# Patient Record
Sex: FEMALE | Race: BLACK OR AFRICAN AMERICAN
[De-identification: names, ages, dates, MRNs, and addresses within clinical notes are randomized per-mention and may not be internally consistent; named-entity substitution may affect disease eponyms.]

---

## 2018-09-12 NOTE — PRE-PROCEDURE NOTE/ATTESTATION
Pre-Procedure Note/Attestation


Complete Prior to Procedure


Planned Procedure:  left


Procedure Narrative:


phaco with IOL





Indications for Procedure


Pre-Operative Diagnosis:


cataract





Attestation


I attest that I discussed the nature of the procedure; its benefits; risks and 

complications; and alternatives (and the risks and benefits of such alternatives

), prior to the procedure, with the patient (or the patient's legal 

representative).





I attest that, if there was a reasonable possibility of needing a blood 

transfusion, the patient (or the patient's legal representative) was given the 

[facility] standardized written summary, pursuant 

to the Dwayne [place] Blood Safety Act ([place] Health and Safety Code # 1645, as 

amended).





I attest that I re-evaluated the patient just prior to the surgery and that 

there has been no change in the patient's H&P, except as documented below:











JEANNETTE BORGES Sep 12, 2018 16:10

## 2018-09-12 NOTE — OPTHALMOLOGY H&P
Ophthalmology H&P


H&P


Chief Complaint:  decreased vision in left eye





HPI


Vision Affects Ability to:  read, focus/use eyes together, manage personal 

affairs


HPI Narrative


blurry vision





Exam


Visual Acuity:  OD: 20/60    OS: 20/100


Tension:  OD: 19         OS: 20


Eye Exam:  normal OU: external exam, palpebral fissure-width, marginal reflex 

distance, levator function, corneas, anterior chambers, fundus exam; findings: 

lens - OD: ns    OS: ns





Assessment/Plan


Diagnosis:  


(1) Nuclear sclerotic cataract of left eye


Treatment Plan:  cataract extraction w/ lens implant


Goals of Treatment:  improvement of vision, enhance quality of life





Attestation


Attestation


The risks and benefits of the surgery as well as alternative procedures were 

explained to the patient in detail.











JEANNETTE BORGES Sep 12, 2018 16:12

## 2018-09-13 LAB
ADD MANUAL DIFF: YES
ANION GAP SERPL CALC-SCNC: 7 MMOL/L (ref 5–15)
APTT BLD: 24 SEC (ref 23–33)
BUN SERPL-MCNC: 15 MG/DL (ref 7–18)
CALCIUM SERPL-MCNC: 8.9 MG/DL (ref 8.5–10.1)
CHLORIDE SERPL-SCNC: 105 MMOL/L (ref 98–107)
CO2 SERPL-SCNC: 31 MMOL/L (ref 21–32)
CREAT SERPL-MCNC: 0.9 MG/DL (ref 0.55–1.3)
ERYTHROCYTE [DISTWIDTH] IN BLOOD BY AUTOMATED COUNT: 17.5 % (ref 11.6–14.8)
HCT VFR BLD CALC: 42.8 % (ref 37–47)
HGB BLD-MCNC: 13.6 G/DL (ref 12–16)
INR PPP: 1 (ref 0.9–1.1)
MCV RBC AUTO: 94 FL (ref 80–99)
PLATELET # BLD: 272 K/UL (ref 150–450)
POTASSIUM SERPL-SCNC: 3.7 MMOL/L (ref 3.5–5.1)
RBC # BLD AUTO: 4.53 M/UL (ref 4.2–5.4)
SODIUM SERPL-SCNC: 143 MMOL/L (ref 136–145)
WBC # BLD AUTO: 4.6 K/UL (ref 4.8–10.8)

## 2018-09-16 NOTE — CARDIOLOGY REPORT
--------------- APPROVED REPORT --------------





EKG Measurement

Heart [id number]

OR [id number]

[id number]

[id number]

[id number]





Normal sinus rhythm with sinus arrhythmia

Moderate voltage criteria for LVH, may be normal variant

Prolonged QT

Abnormal ECG

## 2018-09-17 ENCOUNTER — HOSPITAL ENCOUNTER (OUTPATIENT)
Dept: HOSPITAL 72 - SUR | Age: 73
Discharge: HOME | End: 2018-09-17
Payer: MEDICARE

## 2018-09-17 VITALS — SYSTOLIC BLOOD PRESSURE: 136 MMHG | DIASTOLIC BLOOD PRESSURE: 76 MMHG

## 2018-09-17 VITALS — SYSTOLIC BLOOD PRESSURE: 130 MMHG | DIASTOLIC BLOOD PRESSURE: 70 MMHG

## 2018-09-17 VITALS — DIASTOLIC BLOOD PRESSURE: 80 MMHG | SYSTOLIC BLOOD PRESSURE: 137 MMHG

## 2018-09-17 VITALS — SYSTOLIC BLOOD PRESSURE: 132 MMHG | DIASTOLIC BLOOD PRESSURE: 71 MMHG

## 2018-09-17 VITALS — DIASTOLIC BLOOD PRESSURE: 81 MMHG | SYSTOLIC BLOOD PRESSURE: 133 MMHG

## 2018-09-17 VITALS — DIASTOLIC BLOOD PRESSURE: 78 MMHG | SYSTOLIC BLOOD PRESSURE: 139 MMHG

## 2018-09-17 VITALS — SYSTOLIC BLOOD PRESSURE: 132 MMHG | DIASTOLIC BLOOD PRESSURE: 79 MMHG

## 2018-09-17 VITALS — BODY MASS INDEX: 29.41 KG/M2 | HEIGHT: 63 IN | WEIGHT: 166 LBS

## 2018-09-17 VITALS — SYSTOLIC BLOOD PRESSURE: 134 MMHG | DIASTOLIC BLOOD PRESSURE: 74 MMHG

## 2018-09-17 VITALS — SYSTOLIC BLOOD PRESSURE: 141 MMHG | DIASTOLIC BLOOD PRESSURE: 79 MMHG

## 2018-09-17 VITALS — DIASTOLIC BLOOD PRESSURE: 79 MMHG | SYSTOLIC BLOOD PRESSURE: 132 MMHG

## 2018-09-17 DIAGNOSIS — J44.9: ICD-10-CM

## 2018-09-17 DIAGNOSIS — M19.90: ICD-10-CM

## 2018-09-17 DIAGNOSIS — I11.0: ICD-10-CM

## 2018-09-17 DIAGNOSIS — I50.9: ICD-10-CM

## 2018-09-17 DIAGNOSIS — H40.9: ICD-10-CM

## 2018-09-17 DIAGNOSIS — M32.9: ICD-10-CM

## 2018-09-17 DIAGNOSIS — K21.9: ICD-10-CM

## 2018-09-17 DIAGNOSIS — E66.9: ICD-10-CM

## 2018-09-17 DIAGNOSIS — D64.9: ICD-10-CM

## 2018-09-17 DIAGNOSIS — H25.12: Primary | ICD-10-CM

## 2018-09-17 PROCEDURE — 85730 THROMBOPLASTIN TIME PARTIAL: CPT

## 2018-09-17 PROCEDURE — 85007 BL SMEAR W/DIFF WBC COUNT: CPT

## 2018-09-17 PROCEDURE — 94003 VENT MGMT INPAT SUBQ DAY: CPT

## 2018-09-17 PROCEDURE — 94150 VITAL CAPACITY TEST: CPT

## 2018-09-17 PROCEDURE — 85610 PROTHROMBIN TIME: CPT

## 2018-09-17 PROCEDURE — 66984 XCAPSL CTRC RMVL W/O ECP: CPT

## 2018-09-17 PROCEDURE — 36415 COLL VENOUS BLD VENIPUNCTURE: CPT

## 2018-09-17 PROCEDURE — 93005 ELECTROCARDIOGRAM TRACING: CPT

## 2018-09-17 PROCEDURE — 80048 BASIC METABOLIC PNL TOTAL CA: CPT

## 2018-09-17 PROCEDURE — 85025 COMPLETE CBC W/AUTO DIFF WBC: CPT

## 2018-09-17 RX ADMIN — PHENYLEPHRINE HYDROCHLORIDE SCH DROP: 100 SOLUTION/ DROPS OPHTHALMIC at 08:26

## 2018-09-17 RX ADMIN — Medication SCH DROP: at 08:26

## 2018-09-17 RX ADMIN — DICLOFENAC SODIUM SCH DROP: 1 SOLUTION/ DROPS OPHTHALMIC at 08:25

## 2018-09-17 RX ADMIN — CYCLOPENTOLATE HYDROCHLORIDE SCH DROP: 10 SOLUTION OPHTHALMIC at 08:34

## 2018-09-17 RX ADMIN — DICLOFENAC SODIUM SCH DROP: 1 SOLUTION/ DROPS OPHTHALMIC at 08:20

## 2018-09-17 RX ADMIN — CYCLOPENTOLATE HYDROCHLORIDE SCH DROP: 10 SOLUTION OPHTHALMIC at 08:25

## 2018-09-17 RX ADMIN — TOBRAMYCIN SCH DROP: 3 SOLUTION OPHTHALMIC at 08:20

## 2018-09-17 RX ADMIN — TOBRAMYCIN SCH DROP: 3 SOLUTION OPHTHALMIC at 08:25

## 2018-09-17 RX ADMIN — PHENYLEPHRINE HYDROCHLORIDE SCH DROP: 100 SOLUTION/ DROPS OPHTHALMIC at 08:34

## 2018-09-17 RX ADMIN — PHENYLEPHRINE HYDROCHLORIDE SCH DROP: 100 SOLUTION/ DROPS OPHTHALMIC at 08:20

## 2018-09-17 RX ADMIN — TOBRAMYCIN SCH DROP: 3 SOLUTION OPHTHALMIC at 08:34

## 2018-09-17 RX ADMIN — Medication SCH DROP: at 08:34

## 2018-09-17 RX ADMIN — DICLOFENAC SODIUM SCH DROP: 1 SOLUTION/ DROPS OPHTHALMIC at 08:34

## 2018-09-17 RX ADMIN — Medication SCH DROP: at 08:20

## 2018-09-17 RX ADMIN — CYCLOPENTOLATE HYDROCHLORIDE SCH DROP: 10 SOLUTION OPHTHALMIC at 08:20

## 2018-09-17 NOTE — 48 HOUR POST ANESTHESIA EVAL
Post Anesthesia Evaluation


Procedure:  LEFT cataract extraction with IOL


Date of Evaluation:  Sep 17, 2018


Time of Evaluation:  11:30


Blood Pressure Systolic:  132


0:  79


Pulse Rate:  70


Respiratory Rate:  14


Temperature (Fahrenheit):  98.1


O2 Sat by Pulse Oximetry:  100


Airway:  patent


Nausea:  No


Vomiting:  No


Pain Intensity:  0


Hydration Status:  adequate


Cardiopulmonary Status:


stable


Mental Status/LOC:  patient returned to baseline


Follow-up Care/Observations:


none


Post-Anesthesia Complications:


none


Follow-up care needed:  ready to discharge











Anali Lange CRNA Sep 17, 2018 12:48

## 2018-09-17 NOTE — IMMEDIATE POST-OP EVALUATION
Immediate Post-Op Evalulation


Immediate Post-Op Evalulation


Procedure:  LEFT cataract extraction with IOL


Date of Evaluation:  Sep 17, 2018


Time of Evaluation:  10:47


IV Fluids:   ML


Estimated Blood Loss:  01


Blood Pressure Systolic:  132


Blood Pressure Diastolic:  71


Pulse Rate:  77


Respiratory Rate:  19


O2 Sat by Pulse Oximetry:  100


Temperature (Fahrenheit):  98.1


Pain Score (1-10):  0


Nausea:  No


Vomiting:  No


Complications


none


Patient Status:  awake, reacts, patent


Hydration Status:  adequate


Given Within 1 Hr of Incision:  Anali Evans CRNA Sep 17, 2018 10:54

## 2018-09-17 NOTE — ANETHESIA PREOPERATIVE EVAL
Anesthesia Pre-op PMH/ROS


General


Date of Evaluation:  Sep 17, 2018


Time of Evaluation:  09:44


Anesthesiologist:  Jaz Lange CRNA


ASA Score:  ASA 3


Mallampati Score


Class I : Soft palate, uvula, fauces, pillars visible


Class II: Soft palate, uvula, fauces visible


Class III: Soft palate, base of uvula visible


Class IV: Only hard plate visible


Mallampati Classification:  Class II


Surgeon:  Cherry


Diagnosis:  LEFT cataract


Surgical Procedure:  LEFT cataract extraction with IOL


Anesthesia History:  none


Family History:  no anesthesia problems


Allergies:  


Coded Allergies:  


     No Known Allergies (Unverified , 9/17/18)


Medications:  see eMAR





Past Medical History


Cardiovascular:  Reports: HTN, other - takes lasix PRN denies hx of CHF


Pulmonary:  Denies: asthma, COPD, GALE, other


Gastrointestinal/Genitourinary:  Reports: GERD; 


   Denies: CRI, ESRD, other


Neurologic/Psychiatric:  Denies: dementia, CVA, depression/anxiety, TIA, other


Endocrine:  Reports: other - Lupus; 


   Denies: DM, hypothyroidism, steroids


HEENT:  Reports: cataract (L); 


   Denies: cataract (R), glaucoma, Jamul (L), Jamul (R), other


Hematology/Immune:  Reports: anemia; 


   Denies: DVT, bleeding disorder, other


Musculoskeletal/Integumentary:  Reports: OA; 


   Denies: RA, DJD, DDD, edema, other


Other:  obesity


PMH Narrative:


as above


PSxH Narrative:


c/s, Bilateral tubal ligation





Anesthesia Pre-op Phys. Exam


Physician Exam





Last Vital Signs








  Date Time  Temp Pulse Resp B/P (MAP) Pulse Ox O2 Delivery O2 Flow Rate FiO2


 


9/17/18 08:24      Room Air  


 


9/17/18 08:23 97.0 71 18 141/79 (99) 100   





 97.0       








Constitutional:  NAD


Neurologic:  CN 2-12 intact


Cardiovascular:  RRR


Respiratory:  CTA


Gastrointestinal:  S/NT/ND





Airway Exam


Mallampati Score:  Class II


MO:  full


ROM:  full


Teeth:  broken


Dentures:  no upper, no lower





Anesthesia Pre-op A/P


Labs


see chart





Studies


Pre-op Studies:  EKG - NSR with SA, LVH, prolonged QT





Risk Assessment & Plan


Assessment:


ASA 3ok to proceed


Plan:


MAC


Status Change Before Surgery:  No





Pre-Antibiotics


Given Within 1 Hr of Incision:  No











Anali Lange CRNA Sep 17, 2018 09:47

## 2018-09-18 NOTE — BRIEF OPERATIVE NOTE
Immediate Post Operative Note


Operative Note


Chief Complaint:  blurry vision


Pre-op Diagnosis:


cataract, OS


Procedure:


phaco with IOL


Post-op Diagnosis:


Pseudophakia


Post-op Diagnosis:  same as pre-op


Findings:  consistent w/pre-op dx studies


Surgeon:  Cherry


Anesthesiologist:  Nazario


Anesthesia:  MAC


Specimen:  none


Complications:  none


Condition:  stable


Fluids:  LR


Estimated Blood Loss:  none


Drains:  none


Implant(s) used?:  Yes











JEANNETTE BORGES Sep 18, 2018 17:05

## 2018-09-19 NOTE — OPERATIVE NOTE - PDOC
Operative Note


Operative Note


Date of Operation/Procedure:  Sep 17, 2018


Chief Complaint:  blurry vision


Pre-op Diagnosis:


cataract, OS


Procedure:


phaco with IOL


Post-op Diagnosis:


Pseudophakia


Post-op Diagnosis:  same as pre-op


Operative Findings:  consistent w/pre-op dx studies


Surgeon:  Cherry


Anesthesiologist:  Nazario


Anesthesia:  MAC


Specimen:  none


Complications:  none


Condition:  stable


Fluids:  LR


Estimated Blood Loss:  none


Drains:  none


Implant(s) used?:  Yes


Indications for Procedure


cataract


Description of Procedure


This patient has been complaining visually significant cataract in the affected 

eye with the best corrected visual acuity under moderate glare conditions 

worse. The patient complains of difficulties with glare in performing 

activities of daily living and wants to manage personal affairs with comfort 

and accuracy and see well enough to move with safety at home and outdoors.


    


The risks, benefits and alternatives of the procedure were discussed with the 

patient in the office prior to scheduling surgery. All questions from the 

patient were answered after the surgical procedure was explained in detail. The 

risks of the procedure as explained to the patient include, but are not limited 

to, pain, infection, bleeding, loss of vision, retinal detachment, need for 

further surgery, loss of lens nucleus, double vision, etc. Alternative 

procedures were discussed which include, to do nothing or seek a second 

opinion. Informed consent for this procedure was obtained from the patient. The 

patient was referred to a primary care physician for a cardiopulmonary 

clearance prior to surgery, after proper evaluation was done patient was 

properly scheduled for outpatient surgery.


The patient was brought to the operating room where the anesthesiologist 

established I.V. lines and cardiac monitoring leads. Mild intravenous sedation 

was administered.   The patient was then prepared with a 5% solution of povidone

-iodine to the conjunctival fornix and lashes, and a  5% solution of povidone-

iodine to the lids and periorbital skin. The patient was then draped in the 

usual sterile fashion. A lid speculum was then placed in the operative eye. A 

keratome blade was then used to create a biplanar incision into the anterior 

chamber. Viscoelastics was then instilled into the anterior chamber.


A curvilinear capsulorrhexis was then fashioned with an utrata forceps. A BSS 

was used with G-27 cannula was  used to hydrodissect  and hydrodelineate the 

lens nucleus. Paracentesis incision was made at 9 o'clock with sharp blade. The 

phacoemulsification unit, after being properly adjusted  and tested, was then 

used to emulsify the nucleus. Residual cortical material was aspirated with the 

irrigation and aspiration unit. Healon was then instilled into the anterior 

chamber. The corneal wound was then enlarged to the size of the optic with the 

barbara keratome blade. The intraocular lens was then inspected for right  

power and size  and thought to be satisfactory. Then the lens was gently placed 

in the capsular bag. Positioning within the capsular bag was confirmed by 

direct visualization. Optic centration was accomplished with a Sinskey hook.


Viscoelastics  was removed from the anterior chamber using the irrigation and 

aspiration unit. The corneal wound was then tested for leaks and none were 

found. The lid speculum were then removed. Sponge and needle counts were 

correct. An eye patch and shield were placed over the operative eye. The 

patient was taken to the recovery room in stable condition. There were no 

complications. The patient tolerated the procedure well. The patient was then 

transferred to the ambulatory surgery unit in stable and satisfactory condition

, was given detailed written instructions and asked to follow up  in the office 

the next day.











JEANNETTE BORGES Sep 19, 2018 09:11

## 2019-12-13 ENCOUNTER — HOSPITAL ENCOUNTER (INPATIENT)
Dept: HOSPITAL 87 - ER | Age: 74
LOS: 3 days | Discharge: HOME HEALTH | DRG: 570 | End: 2019-12-16
Attending: HOSPITALIST | Admitting: HOSPITALIST
Payer: MEDICARE

## 2019-12-13 VITALS — HEIGHT: 63 IN | BODY MASS INDEX: 26.75 KG/M2 | WEIGHT: 151 LBS

## 2019-12-13 VITALS — DIASTOLIC BLOOD PRESSURE: 71 MMHG | SYSTOLIC BLOOD PRESSURE: 140 MMHG

## 2019-12-13 DIAGNOSIS — Z87.891: ICD-10-CM
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
## 2019-12-13 DIAGNOSIS — M32.9: ICD-10-CM

## 2019-12-13 DIAGNOSIS — I69.351: ICD-10-CM

## 2019-12-13 DIAGNOSIS — N39.0: ICD-10-CM

## 2019-12-13 DIAGNOSIS — S81.812A: ICD-10-CM

## 2019-12-13 DIAGNOSIS — R26.9: ICD-10-CM

## 2019-12-13 DIAGNOSIS — R47.01: ICD-10-CM

## 2019-12-13 DIAGNOSIS — I10: ICD-10-CM

## 2019-12-13 DIAGNOSIS — Z79.899: ICD-10-CM

## 2019-12-13 DIAGNOSIS — E43: ICD-10-CM

## 2019-12-13 DIAGNOSIS — L97.929: ICD-10-CM

## 2019-12-13 DIAGNOSIS — L03.116: Primary | ICD-10-CM
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
## 2019-12-13 DIAGNOSIS — Z79.82: ICD-10-CM

## 2019-12-13 LAB
APPEARANCE UR: (no result)
BASOPHILS NFR BLD AUTO: 1.7 % (ref 0–2)
CHLORIDE SERPL-SCNC: 102 MEQ/L (ref 98–107)
COLOR UR: (no result)
EOSINOPHIL NFR BLD AUTO: 1.6 % (ref 0–5)
ERYTHROCYTE [DISTWIDTH] IN BLOOD BY AUTOMATED COUNT: 15.5 % (ref 11.6–14.6)
HCT VFR BLD AUTO: 35.9 % (ref 36–48)
HGB BLD-MCNC: 11.8 G/DL (ref 12–16)
HGB UR QL STRIP: NEGATIVE
INR PPP: 1
KETONES UR STRIP-MCNC: NEGATIVE MG/DL
LEUKOCYTE ESTERASE UR QL STRIP: (no result)
LYMPHOCYTES NFR BLD AUTO: 43.9 % (ref 20–50)
MCH RBC QN AUTO: 30.5 PG (ref 28–32)
MCV RBC AUTO: 92.9 FL (ref 81–99)
MONOCYTES NFR BLD AUTO: 3.5 % (ref 2–8)
NEUTROPHILS NFR BLD AUTO: 49.3 % (ref 40–76)
NITRITE UR QL STRIP: POSITIVE
PH UR STRIP: 5.5 [PH] (ref 4.5–8)
PLATELET # BLD AUTO: 253 X1000/UL (ref 130–400)
PMV BLD AUTO: 10.7 FL (ref 7.4–10.4)
PROT UR QL STRIP: (no result)
PROTHROMBIN TIME: 10.4 SEC (ref 9.6–11)
RBC # BLD AUTO: 3.87 MILL/UL (ref 4.2–5.4)
SP GR UR STRIP: 1.04 (ref 1–1.03)
UROBILINOGEN UR STRIP-MCNC: 2 E.U./DL (ref 0.2–1)

## 2019-12-13 PROCEDURE — 80048 BASIC METABOLIC PNL TOTAL CA: CPT

## 2019-12-13 PROCEDURE — 83605 ASSAY OF LACTIC ACID: CPT

## 2019-12-13 PROCEDURE — 81003 URINALYSIS AUTO W/O SCOPE: CPT

## 2019-12-13 PROCEDURE — 36415 COLL VENOUS BLD VENIPUNCTURE: CPT

## 2019-12-13 PROCEDURE — 84145 PROCALCITONIN (PCT): CPT

## 2019-12-13 PROCEDURE — 93971 EXTREMITY STUDY: CPT

## 2019-12-13 PROCEDURE — 71045 X-RAY EXAM CHEST 1 VIEW: CPT

## 2019-12-13 PROCEDURE — 99285 EMERGENCY DEPT VISIT HI MDM: CPT

## 2019-12-13 PROCEDURE — 96365 THER/PROPH/DIAG IV INF INIT: CPT

## 2019-12-13 PROCEDURE — 73590 X-RAY EXAM OF LOWER LEG: CPT

## 2019-12-13 PROCEDURE — 80202 ASSAY OF VANCOMYCIN: CPT

## 2019-12-13 PROCEDURE — 93005 ELECTROCARDIOGRAM TRACING: CPT

## 2019-12-13 PROCEDURE — 84484 ASSAY OF TROPONIN QUANT: CPT

## 2019-12-13 PROCEDURE — 73630 X-RAY EXAM OF FOOT: CPT

## 2019-12-13 RX ADMIN — MORPHINE SULFATE PRN MG: 2 INJECTION, SOLUTION INTRAMUSCULAR; INTRAVENOUS at 22:19

## 2019-12-13 RX ADMIN — ENOXAPARIN SODIUM SCH MG: 100 INJECTION SUBCUTANEOUS at 21:00

## 2019-12-13 RX ADMIN — Medication SCH MLS/HR: at 22:26

## 2019-12-14 VITALS — DIASTOLIC BLOOD PRESSURE: 78 MMHG | SYSTOLIC BLOOD PRESSURE: 141 MMHG

## 2019-12-14 VITALS — DIASTOLIC BLOOD PRESSURE: 47 MMHG | SYSTOLIC BLOOD PRESSURE: 110 MMHG

## 2019-12-14 VITALS — SYSTOLIC BLOOD PRESSURE: 135 MMHG | DIASTOLIC BLOOD PRESSURE: 63 MMHG

## 2019-12-14 VITALS — DIASTOLIC BLOOD PRESSURE: 68 MMHG | SYSTOLIC BLOOD PRESSURE: 127 MMHG

## 2019-12-14 VITALS — SYSTOLIC BLOOD PRESSURE: 115 MMHG | DIASTOLIC BLOOD PRESSURE: 81 MMHG

## 2019-12-14 VITALS — DIASTOLIC BLOOD PRESSURE: 62 MMHG | SYSTOLIC BLOOD PRESSURE: 120 MMHG

## 2019-12-14 LAB
BASOPHILS NFR BLD AUTO: 0.6 % (ref 0–2)
CHLORIDE SERPL-SCNC: 108 MEQ/L (ref 98–107)
EOSINOPHIL NFR BLD AUTO: 3 % (ref 0–5)
ERYTHROCYTE [DISTWIDTH] IN BLOOD BY AUTOMATED COUNT: 15.8 % (ref 11.6–14.6)
HCT VFR BLD AUTO: 30.6 % (ref 36–48)
HGB BLD-MCNC: 10.2 G/DL (ref 12–16)
LYMPHOCYTES NFR BLD AUTO: 38.6 % (ref 20–50)
MCH RBC QN AUTO: 30.5 PG (ref 28–32)
MCV RBC AUTO: 91.7 FL (ref 81–99)
MONOCYTES NFR BLD AUTO: 6.9 % (ref 2–8)
NEUTROPHILS NFR BLD AUTO: 50.9 % (ref 40–76)
PLATELET # BLD AUTO: 215 X1000/UL (ref 130–400)
PMV BLD AUTO: 10.3 FL (ref 7.4–10.4)
RBC # BLD AUTO: 3.34 MILL/UL (ref 4.2–5.4)

## 2019-12-14 RX ADMIN — Medication SCH MLS/HR: at 21:13

## 2019-12-14 RX ADMIN — ATORVASTATIN CALCIUM SCH MG: 20 TABLET, FILM COATED ORAL at 21:12

## 2019-12-14 RX ADMIN — AMLODIPINE BESYLATE SCH MG: 10 TABLET ORAL at 09:40

## 2019-12-14 RX ADMIN — VANCOMYCIN HYDROCHLORIDE SCH MLS/HR: 750 INJECTION, SOLUTION INTRAVENOUS at 09:40

## 2019-12-14 RX ADMIN — CLOPIDOGREL BISULFATE SCH MG: 75 TABLET, FILM COATED ORAL at 09:38

## 2019-12-14 RX ADMIN — MORPHINE SULFATE PRN MG: 2 INJECTION, SOLUTION INTRAMUSCULAR; INTRAVENOUS at 16:42

## 2019-12-14 RX ADMIN — ENOXAPARIN SODIUM SCH MG: 100 INJECTION SUBCUTANEOUS at 21:12

## 2019-12-14 RX ADMIN — Medication SCH MLS/HR: at 05:18

## 2019-12-14 RX ADMIN — Medication SCH UDTAB: at 09:38

## 2019-12-14 RX ADMIN — Medication SCH MLS/HR: at 14:47

## 2019-12-15 VITALS — DIASTOLIC BLOOD PRESSURE: 75 MMHG | SYSTOLIC BLOOD PRESSURE: 132 MMHG

## 2019-12-15 VITALS — DIASTOLIC BLOOD PRESSURE: 69 MMHG | SYSTOLIC BLOOD PRESSURE: 130 MMHG

## 2019-12-15 VITALS — DIASTOLIC BLOOD PRESSURE: 73 MMHG | SYSTOLIC BLOOD PRESSURE: 125 MMHG

## 2019-12-15 VITALS — SYSTOLIC BLOOD PRESSURE: 140 MMHG | DIASTOLIC BLOOD PRESSURE: 72 MMHG

## 2019-12-15 VITALS — SYSTOLIC BLOOD PRESSURE: 137 MMHG | DIASTOLIC BLOOD PRESSURE: 71 MMHG

## 2019-12-15 VITALS — SYSTOLIC BLOOD PRESSURE: 113 MMHG | DIASTOLIC BLOOD PRESSURE: 71 MMHG

## 2019-12-15 RX ADMIN — ENOXAPARIN SODIUM SCH MG: 100 INJECTION SUBCUTANEOUS at 20:48

## 2019-12-15 RX ADMIN — Medication SCH MLS/HR: at 13:24

## 2019-12-15 RX ADMIN — Medication SCH MLS/HR: at 05:49

## 2019-12-15 RX ADMIN — CLOPIDOGREL BISULFATE SCH MG: 75 TABLET, FILM COATED ORAL at 09:17

## 2019-12-15 RX ADMIN — Medication SCH MLS/HR: at 22:19

## 2019-12-15 RX ADMIN — ATORVASTATIN CALCIUM SCH MG: 20 TABLET, FILM COATED ORAL at 20:48

## 2019-12-15 RX ADMIN — VANCOMYCIN HYDROCHLORIDE SCH MLS/HR: 750 INJECTION, SOLUTION INTRAVENOUS at 04:56

## 2019-12-15 RX ADMIN — VANCOMYCIN HYDROCHLORIDE SCH MLS/HR: 750 INJECTION, SOLUTION INTRAVENOUS at 22:19

## 2019-12-15 RX ADMIN — Medication SCH UDTAB: at 09:17

## 2019-12-15 RX ADMIN — MORPHINE SULFATE PRN MG: 2 INJECTION, SOLUTION INTRAMUSCULAR; INTRAVENOUS at 15:08

## 2019-12-15 RX ADMIN — AMLODIPINE BESYLATE SCH MG: 10 TABLET ORAL at 09:17

## 2019-12-15 RX ADMIN — MORPHINE SULFATE PRN MG: 2 INJECTION, SOLUTION INTRAMUSCULAR; INTRAVENOUS at 02:09

## 2019-12-16 VITALS — DIASTOLIC BLOOD PRESSURE: 80 MMHG | SYSTOLIC BLOOD PRESSURE: 148 MMHG

## 2019-12-16 VITALS — SYSTOLIC BLOOD PRESSURE: 121 MMHG | DIASTOLIC BLOOD PRESSURE: 68 MMHG

## 2019-12-16 VITALS — DIASTOLIC BLOOD PRESSURE: 71 MMHG | SYSTOLIC BLOOD PRESSURE: 137 MMHG

## 2019-12-16 VITALS — DIASTOLIC BLOOD PRESSURE: 73 MMHG | SYSTOLIC BLOOD PRESSURE: 138 MMHG

## 2019-12-16 VITALS — DIASTOLIC BLOOD PRESSURE: 82 MMHG | SYSTOLIC BLOOD PRESSURE: 148 MMHG

## 2019-12-16 LAB — CHLORIDE SERPL-SCNC: 106 MEQ/L (ref 98–107)

## 2019-12-16 PROCEDURE — 0JBP0ZZ EXCISION OF LEFT LOWER LEG SUBCUTANEOUS TISSUE AND FASCIA, OPEN APPROACH: ICD-10-PCS | Performed by: PODIATRIST

## 2019-12-16 RX ADMIN — Medication SCH UDTAB: at 08:41

## 2019-12-16 RX ADMIN — MORPHINE SULFATE PRN MG: 2 INJECTION, SOLUTION INTRAMUSCULAR; INTRAVENOUS at 11:27

## 2019-12-16 RX ADMIN — CLOPIDOGREL BISULFATE SCH MG: 75 TABLET, FILM COATED ORAL at 08:43

## 2019-12-16 RX ADMIN — Medication SCH MLS/HR: at 05:59

## 2019-12-16 RX ADMIN — Medication SCH MLS/HR: at 13:42

## 2019-12-16 RX ADMIN — AMLODIPINE BESYLATE SCH MG: 10 TABLET ORAL at 08:42

## 2024-03-18 ENCOUNTER — HOSPITAL ENCOUNTER (INPATIENT)
Dept: HOSPITAL 87 - ER | Age: 79
LOS: 8 days | Discharge: HOSPICE | DRG: 871 | End: 2024-03-26
Attending: INTERNAL MEDICINE | Admitting: INTERNAL MEDICINE
Payer: MEDICARE

## 2024-03-18 VITALS — HEIGHT: 63 IN | WEIGHT: 162 LBS | BODY MASS INDEX: 28.7 KG/M2

## 2024-03-18 VITALS — RESPIRATION RATE: 34 BRPM

## 2024-03-18 VITALS
DIASTOLIC BLOOD PRESSURE: 59 MMHG | SYSTOLIC BLOOD PRESSURE: 87 MMHG | TEMPERATURE: 97.8 F | RESPIRATION RATE: 28 BRPM | HEART RATE: 95 BPM

## 2024-03-18 VITALS — RESPIRATION RATE: 35 BRPM

## 2024-03-18 DIAGNOSIS — J96.01: ICD-10-CM

## 2024-03-18 DIAGNOSIS — I50.9: ICD-10-CM

## 2024-03-18 DIAGNOSIS — J43.2: ICD-10-CM

## 2024-03-18 DIAGNOSIS — Z51.5: ICD-10-CM

## 2024-03-18 DIAGNOSIS — D69.6: ICD-10-CM

## 2024-03-18 DIAGNOSIS — Z87.891: ICD-10-CM

## 2024-03-18 DIAGNOSIS — K80.20: ICD-10-CM

## 2024-03-18 DIAGNOSIS — I16.0: ICD-10-CM

## 2024-03-18 DIAGNOSIS — A41.9: Primary | ICD-10-CM

## 2024-03-18 DIAGNOSIS — Z20.822: ICD-10-CM

## 2024-03-18 DIAGNOSIS — D50.9: ICD-10-CM

## 2024-03-18 DIAGNOSIS — E83.51: ICD-10-CM

## 2024-03-18 DIAGNOSIS — Z79.899: ICD-10-CM

## 2024-03-18 DIAGNOSIS — D53.9: ICD-10-CM

## 2024-03-18 DIAGNOSIS — G92.8: ICD-10-CM

## 2024-03-18 DIAGNOSIS — J84.9: ICD-10-CM

## 2024-03-18 DIAGNOSIS — I25.10: ICD-10-CM

## 2024-03-18 DIAGNOSIS — J98.4: ICD-10-CM

## 2024-03-18 DIAGNOSIS — E87.6: ICD-10-CM

## 2024-03-18 DIAGNOSIS — I69.351: ICD-10-CM

## 2024-03-18 DIAGNOSIS — I11.0: ICD-10-CM

## 2024-03-18 DIAGNOSIS — Z99.81: ICD-10-CM

## 2024-03-18 DIAGNOSIS — E78.5: ICD-10-CM

## 2024-03-18 DIAGNOSIS — I21.A1: ICD-10-CM

## 2024-03-18 DIAGNOSIS — J43.8: ICD-10-CM

## 2024-03-18 DIAGNOSIS — M32.13: ICD-10-CM

## 2024-03-18 DIAGNOSIS — J96.02: ICD-10-CM

## 2024-03-18 DIAGNOSIS — Z79.82: ICD-10-CM

## 2024-03-18 DIAGNOSIS — N17.9: ICD-10-CM

## 2024-03-18 LAB
ALBUMIN SERPL BCP-MCNC: 4.3 G/DL (ref 3.2–4.8)
ALT SERPL W P-5'-P-CCNC: 83 IU/L (ref 10–49)
AMMONIA PLAS-SCNC: 18 UMOL/L (ref ?–32)
ANISOCYTOSIS BLD QL: (no result)
AST SERPL W P-5'-P-CCNC: 150 IU/L (ref ?–34)
BASE EXCESS BLDA CALC-SCNC: -1.7 MMOL/L (ref -2–2)
BG VENT RATE: 18 SET
BILIRUB SERPL-MCNC: 1.5 MG/DL (ref 0.1–1)
BUN SERPL-MCNC: 24 MG/DL (ref 9–23)
CALCIUM SERPL-MCNC: 8.6 MG/DL (ref 8.7–10.4)
CARDIAC TROPONIN I PNL SERPL HS: 2126 NG/L (ref 3–34)
CARDIAC TROPONIN I PNL SERPL HS: 3197 NG/L (ref 3–34)
CHLORIDE SERPL-SCNC: 100 MEQ/L (ref 98–107)
CO2 SERPL-SCNC: 21 MEQ/L (ref 21–32)
COHGB MFR BLDA: 0.3 % (ref 0.5–1.5)
CREAT SERPL-MCNC: 1.3 MG/DL (ref 0.6–1)
DO-HGB MFR BLDA: 0.9 % (ref 0–5)
ERYTHROCYTE [DISTWIDTH] IN BLOOD BY AUTOMATED COUNT: 25.8 % (ref 11.6–14.6)
FERRITIN SERPL-MCNC: 942 NG/ML (ref 10–291)
FOLATE SERPL-MCNC: > 20 NG/ML (ref 5.38–?)
GLUCOSE SERPL-MCNC: 94 MG/DL (ref 70–105)
HCO3 BLDA-SCNC: 22.8 MMOL/L (ref 22–26)
HCT VFR BLD AUTO: 37.2 % (ref 36–48)
HGB BLD-MCNC: 11.7 G/DL (ref 12–16)
HGB BLDA OXIMETRY-MCNC: 11.1 G/DL (ref 12–18)
INHALED O2 CONCENTRATION: 60 %
INR PPP: 1.2
IRON SERPL-MCNC: 19 UG/DL (ref 50–170)
LACTATE SERPL-SCNC: 8.6 MMOL/L (ref 0.4–2)
LYMPHOCYTES NFR BLD MANUAL: 7 % (ref 20–60)
MACROCYTES BLD QL: (no result)
MANUAL DIF COMMENT BLD-IMP: 1
MCH RBC QN AUTO: 31.9 PG (ref 28–32)
MCHC RBC AUTO-ENTMCNC: 31.5 G/DL (ref 31–37)
MCV RBC AUTO: 101.2 FL (ref 81–99)
METHGB MFR BLDA: 0.7 % (ref 0–1.5)
MONOCYTES NFR BLD MANUAL: 5 % (ref 2–8)
NEUTS BAND NFR BLD MANUAL: 14 % (ref 1–6)
NEUTS SEG NFR BLD MANUAL: 74 % (ref 45–75)
OXYHGB MFR BLDA: 98.1 % (ref 94–97)
PCO2 TEMP ADJ BLDA: 37.6 MMHG (ref 35–45)
PH TEMP ADJ BLDA: 7.4 [PH] (ref 7.35–7.45)
PHOSPHATE SERPL-MCNC: 4.4 MG/DL (ref 2.5–4.9)
PLATELET # BLD AUTO: 120 X1000/UL (ref 130–400)
PLATELET # BLD EST: (no result) 10*3/UL
PMV BLD AUTO: 11.6 FL (ref 7.4–10.4)
PO2 TEMP ADJ BLDA: 197.1 MMHG (ref 75–100)
POTASSIUM SERPL-SCNC: 4 MEQ/L (ref 3.5–5.1)
PROT SERPL-MCNC: 7.9 G/DL (ref 6–8.3)
PROTHROMBIN TIME: 12.8 SEC (ref 9.6–11)
RBC # BLD AUTO: 3.67 MILL/UL (ref 4.2–5.4)
SAO2 % BLDA: 99.1 % (ref 92–98.5)
SODIUM SERPL-SCNC: 139 MEQ/L (ref 136–145)
SPECIMEN DRAWN FROM PATIENT: (no result)
TIBC SERPL-MCNC: 308 UG/DL (ref 250–425)
VENTILATION MODE VENT: (no result)
VIT B12 SERPL-MCNC: 832 PG/ML (ref 211–911)
WBC # BLD: 24.4 X1000/UL (ref 4.5–11)
WBC NRBC COR # BLD AUTO: 2 /100 WBC

## 2024-03-18 PROCEDURE — 93306 TTE W/DOPPLER COMPLETE: CPT

## 2024-03-18 PROCEDURE — 85651 RBC SED RATE NONAUTOMATED: CPT

## 2024-03-18 PROCEDURE — 84100 ASSAY OF PHOSPHORUS: CPT

## 2024-03-18 PROCEDURE — 90732 PPSV23 VACC 2 YRS+ SUBQ/IM: CPT

## 2024-03-18 PROCEDURE — 97530 THERAPEUTIC ACTIVITIES: CPT

## 2024-03-18 PROCEDURE — 94660 CPAP INITIATION&MGMT: CPT

## 2024-03-18 PROCEDURE — 84443 ASSAY THYROID STIM HORMONE: CPT

## 2024-03-18 PROCEDURE — 82140 ASSAY OF AMMONIA: CPT

## 2024-03-18 PROCEDURE — 76700 US EXAM ABDOM COMPLETE: CPT

## 2024-03-18 PROCEDURE — 84145 PROCALCITONIN (PCT): CPT

## 2024-03-18 PROCEDURE — 97163 PT EVAL HIGH COMPLEX 45 MIN: CPT

## 2024-03-18 PROCEDURE — 83036 HEMOGLOBIN GLYCOSYLATED A1C: CPT

## 2024-03-18 PROCEDURE — 82805 BLOOD GASES W/O2 SATURATION: CPT

## 2024-03-18 PROCEDURE — 82550 ASSAY OF CK (CPK): CPT

## 2024-03-18 PROCEDURE — 71045 X-RAY EXAM CHEST 1 VIEW: CPT

## 2024-03-18 PROCEDURE — 83735 ASSAY OF MAGNESIUM: CPT

## 2024-03-18 PROCEDURE — 83540 ASSAY OF IRON: CPT

## 2024-03-18 PROCEDURE — 93970 EXTREMITY STUDY: CPT

## 2024-03-18 PROCEDURE — 94640 AIRWAY INHALATION TREATMENT: CPT

## 2024-03-18 PROCEDURE — 82553 CREATINE MB FRACTION: CPT

## 2024-03-18 PROCEDURE — 82607 VITAMIN B-12: CPT

## 2024-03-18 PROCEDURE — 90686 IIV4 VACC NO PRSV 0.5 ML IM: CPT

## 2024-03-18 PROCEDURE — 85025 COMPLETE CBC W/AUTO DIFF WBC: CPT

## 2024-03-18 PROCEDURE — 99291 CRITICAL CARE FIRST HOUR: CPT

## 2024-03-18 PROCEDURE — 97166 OT EVAL MOD COMPLEX 45 MIN: CPT

## 2024-03-18 PROCEDURE — 97116 GAIT TRAINING THERAPY: CPT

## 2024-03-18 PROCEDURE — 83605 ASSAY OF LACTIC ACID: CPT

## 2024-03-18 PROCEDURE — 82375 ASSAY CARBOXYHB QUANT: CPT

## 2024-03-18 PROCEDURE — 82962 GLUCOSE BLOOD TEST: CPT

## 2024-03-18 PROCEDURE — 80061 LIPID PANEL: CPT

## 2024-03-18 PROCEDURE — 84439 ASSAY OF FREE THYROXINE: CPT

## 2024-03-18 PROCEDURE — 80048 BASIC METABOLIC PNL TOTAL CA: CPT

## 2024-03-18 PROCEDURE — 97535 SELF CARE MNGMENT TRAINING: CPT

## 2024-03-18 PROCEDURE — 82746 ASSAY OF FOLIC ACID SERUM: CPT

## 2024-03-18 PROCEDURE — 87426 SARSCOV CORONAVIRUS AG IA: CPT

## 2024-03-18 PROCEDURE — 81003 URINALYSIS AUTO W/O SCOPE: CPT

## 2024-03-18 PROCEDURE — 87804 INFLUENZA ASSAY W/OPTIC: CPT

## 2024-03-18 PROCEDURE — 5A09357 ASSISTANCE WITH RESPIRATORY VENTILATION, LESS THAN 24 CONSECUTIVE HOURS, CONTINUOUS POSITIVE AIRWAY PRESSURE: ICD-10-PCS | Performed by: INTERNAL MEDICINE

## 2024-03-18 PROCEDURE — 36600 WITHDRAWAL OF ARTERIAL BLOOD: CPT

## 2024-03-18 PROCEDURE — 36415 COLL VENOUS BLD VENIPUNCTURE: CPT

## 2024-03-18 PROCEDURE — 83880 ASSAY OF NATRIURETIC PEPTIDE: CPT

## 2024-03-18 PROCEDURE — 93005 ELECTROCARDIOGRAM TRACING: CPT

## 2024-03-18 PROCEDURE — 71275 CT ANGIOGRAPHY CHEST: CPT

## 2024-03-18 PROCEDURE — 84484 ASSAY OF TROPONIN QUANT: CPT

## 2024-03-18 PROCEDURE — 80202 ASSAY OF VANCOMYCIN: CPT

## 2024-03-18 PROCEDURE — 80053 COMPREHEN METABOLIC PANEL: CPT

## 2024-03-18 PROCEDURE — 82728 ASSAY OF FERRITIN: CPT

## 2024-03-18 PROCEDURE — 83550 IRON BINDING TEST: CPT

## 2024-03-18 RX ADMIN — SODIUM CHLORIDE ONE UNITS: 0.9 INJECTION, SOLUTION INTRAVENOUS at 21:17

## 2024-03-18 RX ADMIN — HEPARIN SODIUM AND DEXTROSE SCH MLS/HR: 10000; 5 INJECTION INTRAVENOUS at 21:33

## 2024-03-18 RX ADMIN — SODIUM CHLORIDE SCH UNITS: 0.9 INJECTION, SOLUTION INTRAVENOUS at 21:50

## 2024-03-18 RX ADMIN — TAZOBACTAM SODIUM AND PIPERACILLIN SODIUM SCH MLS/HR: 375; 3 INJECTION, SOLUTION INTRAVENOUS at 23:24

## 2024-03-18 RX ADMIN — POTASSIUM ACETATE SCH MLS/HR: 3.93 INJECTION, SOLUTION, CONCENTRATE INTRAVENOUS at 23:37

## 2024-03-18 RX ADMIN — AZITHROMYCIN FOR INJECTION INJECTION, POWDER, LYOPHILIZED, FOR SOLUTION ONE MLS/HR: 500 INJECTION INTRAVENOUS at 21:47

## 2024-03-18 RX ADMIN — CEFTRIAXONE ONE MLS/HR: 2 INJECTION, SOLUTION INTRAVENOUS at 19:15

## 2024-03-18 RX ADMIN — SODIUM CHLORIDE ONE MLS/HR: 9 INJECTION, SOLUTION INTRAVENOUS at 19:45

## 2024-03-19 VITALS
SYSTOLIC BLOOD PRESSURE: 102 MMHG | TEMPERATURE: 98.9 F | HEART RATE: 113 BPM | RESPIRATION RATE: 23 BRPM | DIASTOLIC BLOOD PRESSURE: 63 MMHG

## 2024-03-19 VITALS
SYSTOLIC BLOOD PRESSURE: 102 MMHG | RESPIRATION RATE: 25 BRPM | HEART RATE: 96 BPM | DIASTOLIC BLOOD PRESSURE: 66 MMHG | TEMPERATURE: 97 F

## 2024-03-19 VITALS — RESPIRATION RATE: 19 BRPM | HEART RATE: 106 BPM | DIASTOLIC BLOOD PRESSURE: 58 MMHG | SYSTOLIC BLOOD PRESSURE: 108 MMHG

## 2024-03-19 VITALS — RESPIRATION RATE: 25 BRPM | SYSTOLIC BLOOD PRESSURE: 127 MMHG | HEART RATE: 101 BPM | DIASTOLIC BLOOD PRESSURE: 61 MMHG

## 2024-03-19 VITALS
DIASTOLIC BLOOD PRESSURE: 62 MMHG | RESPIRATION RATE: 27 BRPM | HEART RATE: 104 BPM | TEMPERATURE: 98 F | SYSTOLIC BLOOD PRESSURE: 119 MMHG

## 2024-03-19 VITALS — RESPIRATION RATE: 35 BRPM

## 2024-03-19 VITALS — DIASTOLIC BLOOD PRESSURE: 63 MMHG | SYSTOLIC BLOOD PRESSURE: 116 MMHG | RESPIRATION RATE: 31 BRPM | HEART RATE: 92 BPM

## 2024-03-19 VITALS — SYSTOLIC BLOOD PRESSURE: 129 MMHG | RESPIRATION RATE: 25 BRPM | HEART RATE: 105 BPM | DIASTOLIC BLOOD PRESSURE: 72 MMHG

## 2024-03-19 VITALS
RESPIRATION RATE: 28 BRPM | HEART RATE: 102 BPM | SYSTOLIC BLOOD PRESSURE: 129 MMHG | TEMPERATURE: 97.9 F | DIASTOLIC BLOOD PRESSURE: 68 MMHG

## 2024-03-19 VITALS — HEART RATE: 89 BPM | DIASTOLIC BLOOD PRESSURE: 65 MMHG | RESPIRATION RATE: 23 BRPM | SYSTOLIC BLOOD PRESSURE: 113 MMHG

## 2024-03-19 VITALS — RESPIRATION RATE: 28 BRPM

## 2024-03-19 VITALS — RESPIRATION RATE: 34 BRPM

## 2024-03-19 VITALS — SYSTOLIC BLOOD PRESSURE: 118 MMHG | HEART RATE: 94 BPM | RESPIRATION RATE: 33 BRPM | DIASTOLIC BLOOD PRESSURE: 64 MMHG

## 2024-03-19 VITALS — RESPIRATION RATE: 29 BRPM

## 2024-03-19 VITALS
RESPIRATION RATE: 19 BRPM | SYSTOLIC BLOOD PRESSURE: 111 MMHG | HEART RATE: 93 BPM | DIASTOLIC BLOOD PRESSURE: 68 MMHG | TEMPERATURE: 98.1 F

## 2024-03-19 VITALS
HEART RATE: 93 BPM | SYSTOLIC BLOOD PRESSURE: 108 MMHG | TEMPERATURE: 96.5 F | DIASTOLIC BLOOD PRESSURE: 73 MMHG | RESPIRATION RATE: 33 BRPM

## 2024-03-19 VITALS — RESPIRATION RATE: 22 BRPM

## 2024-03-19 VITALS — RESPIRATION RATE: 27 BRPM

## 2024-03-19 VITALS — RESPIRATION RATE: 23 BRPM

## 2024-03-19 LAB
ALBUMIN SERPL BCP-MCNC: 3.6 G/DL (ref 3.2–4.8)
ALT SERPL W P-5'-P-CCNC: 81 IU/L (ref 10–49)
AST SERPL W P-5'-P-CCNC: 151 IU/L (ref ?–34)
BILIRUB SERPL-MCNC: 0.7 MG/DL (ref 0.1–1)
BUN SERPL-MCNC: 31 MG/DL (ref 9–23)
CALCIUM SERPL-MCNC: 7.9 MG/DL (ref 8.7–10.4)
CARDIAC TROPONIN I PNL SERPL HS: 3734 NG/L (ref 3–34)
CARDIAC TROPONIN I PNL SERPL HS: 4321 NG/L (ref 3–34)
CARDIAC TROPONIN I PNL SERPL HS: 4408 NG/L (ref 3–34)
CHLORIDE SERPL-SCNC: 100 MEQ/L (ref 98–107)
CHOLEST SERPL-MCNC: 75 MG/DL (ref ?–200)
CK MB SERPL-CCNC: 12.7 NG/ML (ref 0.5–3.6)
CK MB SERPL-CCNC: 18.7 NG/ML (ref 0.5–3.6)
CK MB SERPL-CCNC: 19.7 NG/ML (ref 0.5–3.6)
CK SERPL-CCNC: 816 IU/L (ref 34–145)
CK SERPL-CCNC: 835 IU/L (ref 34–145)
CK SERPL-CCNC: 836 IU/L (ref 34–145)
CO2 SERPL-SCNC: 28 MEQ/L (ref 21–32)
CREAT SERPL-MCNC: 1.4 MG/DL (ref 0.6–1)
ERYTHROCYTE [DISTWIDTH] IN BLOOD BY AUTOMATED COUNT: 24.8 % (ref 11.6–14.6)
GLUCOSE SERPL-MCNC: 101 MG/DL (ref 70–105)
HCT VFR BLD AUTO: 32 % (ref 36–48)
HDLC SERPL-MCNC: 34 MG/DL (ref 65–?)
HGB BLD-MCNC: 10.2 G/DL (ref 12–16)
LDLC SERPL DIRECT ASSAY-MCNC: 25 MG/DL (ref 5–100)
LYMPHOCYTES NFR BLD MANUAL: 6 % (ref 20–60)
MANUAL DIF COMMENT BLD-IMP: 1
MCH RBC QN AUTO: 31 PG (ref 28–32)
MCHC RBC AUTO-ENTMCNC: 31.7 G/DL (ref 31–37)
MCV RBC AUTO: 97.7 FL (ref 81–99)
METAMYELOCYTES NFR BLD MANUAL: 9 % (ref 0–0)
MONOCYTES NFR BLD MANUAL: 6 % (ref 2–8)
MYELOCYTES NFR BLD MANUAL: 1 % (ref 0–0)
NEUTS BAND NFR BLD MANUAL: 14 % (ref 1–6)
NEUTS SEG NFR BLD MANUAL: 64 % (ref 45–75)
PLATELET # BLD AUTO: 108 X1000/UL (ref 130–400)
PLATELET # BLD EST: NORMAL 10*3/UL
PMV BLD AUTO: 12.6 FL (ref 7.4–10.4)
POTASSIUM SERPL-SCNC: 3 MEQ/L (ref 3.5–5.1)
PROT SERPL-MCNC: 6.1 G/DL (ref 6–8.3)
RBC # BLD AUTO: 3.27 MILL/UL (ref 4.2–5.4)
RBC MORPH BLD: NORMAL
SODIUM SERPL-SCNC: 139 MEQ/L (ref 136–145)
T4 FREE SERPL-MCNC: 1.25 NG/DL (ref 0.89–1.76)
TRIGL SERPL-MCNC: 45 MG/DL (ref 0–150)
TSH SERPL DL<=0.005 MIU/L-ACNC: 1.44 UIU/ML (ref 0.55–4.78)
WBC # BLD: 29.6 X1000/UL (ref 4.5–11)

## 2024-03-19 PROCEDURE — 5A09357 ASSISTANCE WITH RESPIRATORY VENTILATION, LESS THAN 24 CONSECUTIVE HOURS, CONTINUOUS POSITIVE AIRWAY PRESSURE: ICD-10-PCS | Performed by: INTERNAL MEDICINE

## 2024-03-19 RX ADMIN — Medication SCH STRIP: at 01:39

## 2024-03-19 RX ADMIN — PANTOPRAZOLE SODIUM SCH MG: 40 INJECTION, POWDER, FOR SOLUTION INTRAVENOUS at 09:44

## 2024-03-19 RX ADMIN — MAGNESIUM SULFATE IN WATER NR MLS/HR: 40 INJECTION, SOLUTION INTRAVENOUS at 03:33

## 2024-03-19 RX ADMIN — POTASSIUM CHLORIDE NR MEQ: 1.5 POWDER, FOR SOLUTION ORAL at 18:05

## 2024-03-19 RX ADMIN — VANCOMYCIN NR MLS/HR: 1.75 INJECTION, SOLUTION INTRAVENOUS at 04:54

## 2024-03-19 RX ADMIN — ASPIRIN SCH MG: 81 TABLET, COATED ORAL at 09:44

## 2024-03-19 RX ADMIN — AMLODIPINE BESYLATE SCH MG: 2.5 TABLET ORAL at 09:44

## 2024-03-19 RX ADMIN — IPRATROPIUM BROMIDE AND ALBUTEROL SULFATE SCH ML: .5; 3 SOLUTION RESPIRATORY (INHALATION) at 02:16

## 2024-03-19 RX ADMIN — ATORVASTATIN CALCIUM SCH MG: 40 TABLET, FILM COATED ORAL at 22:18

## 2024-03-19 RX ADMIN — POTASSIUM CHLORIDE SCH MLS/HR: 200 INJECTION, SOLUTION INTRAVENOUS at 10:28

## 2024-03-20 VITALS — DIASTOLIC BLOOD PRESSURE: 55 MMHG | SYSTOLIC BLOOD PRESSURE: 100 MMHG | RESPIRATION RATE: 24 BRPM | HEART RATE: 95 BPM

## 2024-03-20 VITALS — OXYGEN SATURATION: 97 % | RESPIRATION RATE: 18 BRPM | HEART RATE: 95 BPM

## 2024-03-20 VITALS
HEART RATE: 99 BPM | DIASTOLIC BLOOD PRESSURE: 61 MMHG | TEMPERATURE: 98.5 F | SYSTOLIC BLOOD PRESSURE: 108 MMHG | RESPIRATION RATE: 25 BRPM

## 2024-03-20 VITALS — DIASTOLIC BLOOD PRESSURE: 60 MMHG | SYSTOLIC BLOOD PRESSURE: 111 MMHG | HEART RATE: 105 BPM | RESPIRATION RATE: 29 BRPM

## 2024-03-20 VITALS — RESPIRATION RATE: 24 BRPM

## 2024-03-20 VITALS
RESPIRATION RATE: 29 BRPM | HEART RATE: 102 BPM | SYSTOLIC BLOOD PRESSURE: 129 MMHG | DIASTOLIC BLOOD PRESSURE: 65 MMHG | TEMPERATURE: 98 F

## 2024-03-20 VITALS — SYSTOLIC BLOOD PRESSURE: 113 MMHG | DIASTOLIC BLOOD PRESSURE: 57 MMHG | HEART RATE: 102 BPM | RESPIRATION RATE: 25 BRPM

## 2024-03-20 VITALS
SYSTOLIC BLOOD PRESSURE: 112 MMHG | DIASTOLIC BLOOD PRESSURE: 63 MMHG | TEMPERATURE: 98.9 F | RESPIRATION RATE: 23 BRPM | HEART RATE: 102 BPM

## 2024-03-20 VITALS — RESPIRATION RATE: 20 BRPM | HEART RATE: 93 BPM | OXYGEN SATURATION: 96 %

## 2024-03-20 VITALS — RESPIRATION RATE: 28 BRPM

## 2024-03-20 VITALS — HEART RATE: 100 BPM | RESPIRATION RATE: 23 BRPM | DIASTOLIC BLOOD PRESSURE: 60 MMHG | SYSTOLIC BLOOD PRESSURE: 102 MMHG

## 2024-03-20 VITALS
DIASTOLIC BLOOD PRESSURE: 75 MMHG | HEART RATE: 116 BPM | TEMPERATURE: 97.9 F | RESPIRATION RATE: 30 BRPM | SYSTOLIC BLOOD PRESSURE: 144 MMHG

## 2024-03-20 VITALS — DIASTOLIC BLOOD PRESSURE: 60 MMHG | HEART RATE: 93 BPM | RESPIRATION RATE: 23 BRPM | SYSTOLIC BLOOD PRESSURE: 96 MMHG

## 2024-03-20 VITALS — RESPIRATION RATE: 37 BRPM

## 2024-03-20 VITALS
TEMPERATURE: 99.3 F | RESPIRATION RATE: 30 BRPM | SYSTOLIC BLOOD PRESSURE: 105 MMHG | HEART RATE: 90 BPM | DIASTOLIC BLOOD PRESSURE: 63 MMHG

## 2024-03-20 VITALS
HEART RATE: 93 BPM | RESPIRATION RATE: 25 BRPM | TEMPERATURE: 98.2 F | SYSTOLIC BLOOD PRESSURE: 107 MMHG | DIASTOLIC BLOOD PRESSURE: 52 MMHG

## 2024-03-20 VITALS — RESPIRATION RATE: 27 BRPM | HEART RATE: 102 BPM

## 2024-03-20 VITALS — RESPIRATION RATE: 22 BRPM

## 2024-03-20 LAB
ANISOCYTOSIS BLD QL: (no result)
BUN SERPL-MCNC: 30 MG/DL (ref 9–23)
CALCIUM SERPL-MCNC: 7.4 MG/DL (ref 8.7–10.4)
CARDIAC TROPONIN I PNL SERPL HS: 1461 NG/L (ref 3–34)
CHLORIDE SERPL-SCNC: 109 MEQ/L (ref 98–107)
CO2 SERPL-SCNC: 27 MEQ/L (ref 21–32)
CREAT SERPL-MCNC: 1.1 MG/DL (ref 0.6–1)
EOSINOPHIL NFR BLD MANUAL: 1 % (ref 0–5)
ERYTHROCYTE [DISTWIDTH] IN BLOOD BY AUTOMATED COUNT: 24.7 % (ref 11.6–14.6)
GIANT PLATELETS BLD QL SMEAR: (no result)
GLUCOSE SERPL-MCNC: 93 MG/DL (ref 70–105)
HCT VFR BLD AUTO: 28.2 % (ref 36–48)
HGB BLD-MCNC: 9.1 G/DL (ref 12–16)
LYMPHOCYTES NFR BLD MANUAL: 9 % (ref 20–60)
MANUAL DIF COMMENT BLD-IMP: 1
MCH RBC QN AUTO: 31.3 PG (ref 28–32)
MCHC RBC AUTO-ENTMCNC: 32.4 G/DL (ref 31–37)
MCV RBC AUTO: 96.6 FL (ref 81–99)
METAMYELOCYTES NFR BLD MANUAL: 1 % (ref 0–0)
MONOCYTES NFR BLD MANUAL: 7 % (ref 2–8)
NEUTS BAND NFR BLD MANUAL: 27 % (ref 1–6)
NEUTS SEG NFR BLD MANUAL: 55 % (ref 45–75)
PLATELET # BLD AUTO: 110 X1000/UL (ref 130–400)
PLATELET # BLD EST: (no result) 10*3/UL
PMV BLD AUTO: 12.9 FL (ref 7.4–10.4)
POTASSIUM SERPL-SCNC: 3.7 MEQ/L (ref 3.5–5.1)
RBC # BLD AUTO: 2.92 MILL/UL (ref 4.2–5.4)
SODIUM SERPL-SCNC: 141 MEQ/L (ref 136–145)
WBC # BLD: 19.9 X1000/UL (ref 4.5–11)
WBC NRBC COR # BLD AUTO: 1 /100 WBC

## 2024-03-20 PROCEDURE — 5A09357 ASSISTANCE WITH RESPIRATORY VENTILATION, LESS THAN 24 CONSECUTIVE HOURS, CONTINUOUS POSITIVE AIRWAY PRESSURE: ICD-10-PCS | Performed by: INTERNAL MEDICINE

## 2024-03-20 RX ADMIN — CALCIUM GLUCONATE NR MLS/HR: 20 INJECTION, SOLUTION INTRAVENOUS at 22:31

## 2024-03-20 RX ADMIN — ACETYLCYSTEINE SCH MG: 200 INHALANT RESPIRATORY (INHALATION) at 00:43

## 2024-03-20 RX ADMIN — ACETAMINOPHEN PRN MG: 325 TABLET, FILM COATED ORAL at 19:56

## 2024-03-20 RX ADMIN — Medication PRN MG: at 19:56

## 2024-03-20 RX ADMIN — BUDESONIDE SCH MG: 0.5 SUSPENSION RESPIRATORY (INHALATION) at 00:42

## 2024-03-20 RX ADMIN — ENOXAPARIN SODIUM SCH MG: 80 INJECTION SUBCUTANEOUS at 12:55

## 2024-03-20 RX ADMIN — CLONIDINE HYDROCHLORIDE PRN MG: 0.1 TABLET ORAL at 19:57

## 2024-03-20 RX ADMIN — VANCOMYCIN HYDROCHLORIDE SCH MLS/HR: 750 INJECTION, SOLUTION INTRAVENOUS at 09:20

## 2024-03-21 VITALS
SYSTOLIC BLOOD PRESSURE: 94 MMHG | HEART RATE: 88 BPM | TEMPERATURE: 98.4 F | DIASTOLIC BLOOD PRESSURE: 52 MMHG | RESPIRATION RATE: 18 BRPM

## 2024-03-21 VITALS — SYSTOLIC BLOOD PRESSURE: 96 MMHG | RESPIRATION RATE: 25 BRPM | HEART RATE: 91 BPM | DIASTOLIC BLOOD PRESSURE: 43 MMHG

## 2024-03-21 VITALS
RESPIRATION RATE: 19 BRPM | HEART RATE: 92 BPM | SYSTOLIC BLOOD PRESSURE: 122 MMHG | DIASTOLIC BLOOD PRESSURE: 63 MMHG | TEMPERATURE: 98.8 F

## 2024-03-21 VITALS
HEART RATE: 88 BPM | DIASTOLIC BLOOD PRESSURE: 47 MMHG | TEMPERATURE: 98.2 F | SYSTOLIC BLOOD PRESSURE: 106 MMHG | RESPIRATION RATE: 23 BRPM

## 2024-03-21 VITALS — RESPIRATION RATE: 24 BRPM | SYSTOLIC BLOOD PRESSURE: 106 MMHG | DIASTOLIC BLOOD PRESSURE: 61 MMHG | HEART RATE: 89 BPM

## 2024-03-21 VITALS
SYSTOLIC BLOOD PRESSURE: 107 MMHG | TEMPERATURE: 98.8 F | RESPIRATION RATE: 20 BRPM | HEART RATE: 88 BPM | DIASTOLIC BLOOD PRESSURE: 56 MMHG

## 2024-03-21 VITALS — RESPIRATION RATE: 18 BRPM | HEART RATE: 93 BPM | SYSTOLIC BLOOD PRESSURE: 97 MMHG | DIASTOLIC BLOOD PRESSURE: 79 MMHG

## 2024-03-21 VITALS — HEART RATE: 89 BPM | RESPIRATION RATE: 20 BRPM

## 2024-03-21 VITALS — OXYGEN SATURATION: 92 % | RESPIRATION RATE: 23 BRPM | HEART RATE: 92 BPM

## 2024-03-21 VITALS — RESPIRATION RATE: 20 BRPM | HEART RATE: 87 BPM | OXYGEN SATURATION: 96 %

## 2024-03-21 VITALS
HEART RATE: 98 BPM | SYSTOLIC BLOOD PRESSURE: 86 MMHG | DIASTOLIC BLOOD PRESSURE: 62 MMHG | TEMPERATURE: 98.8 F | RESPIRATION RATE: 19 BRPM

## 2024-03-21 VITALS — RESPIRATION RATE: 25 BRPM | SYSTOLIC BLOOD PRESSURE: 98 MMHG | HEART RATE: 87 BPM | DIASTOLIC BLOOD PRESSURE: 59 MMHG

## 2024-03-21 VITALS
SYSTOLIC BLOOD PRESSURE: 116 MMHG | DIASTOLIC BLOOD PRESSURE: 62 MMHG | HEART RATE: 92 BPM | TEMPERATURE: 98.8 F | RESPIRATION RATE: 22 BRPM

## 2024-03-21 VITALS
HEART RATE: 90 BPM | DIASTOLIC BLOOD PRESSURE: 63 MMHG | SYSTOLIC BLOOD PRESSURE: 103 MMHG | RESPIRATION RATE: 29 BRPM | TEMPERATURE: 98.6 F

## 2024-03-21 VITALS — RESPIRATION RATE: 21 BRPM | SYSTOLIC BLOOD PRESSURE: 96 MMHG | DIASTOLIC BLOOD PRESSURE: 46 MMHG | HEART RATE: 94 BPM

## 2024-03-21 VITALS — RESPIRATION RATE: 28 BRPM

## 2024-03-21 VITALS — HEART RATE: 88 BPM | RESPIRATION RATE: 20 BRPM

## 2024-03-21 VITALS — RESPIRATION RATE: 20 BRPM | HEART RATE: 87 BPM

## 2024-03-21 LAB
ANISOCYTOSIS BLD QL: (no result)
APPEARANCE UR: CLEAR
BASE EXCESS BLDA CALC-SCNC: 2.1 MMOL/L (ref -2–2)
BUN SERPL-MCNC: 28 MG/DL (ref 9–23)
CALCIUM SERPL-MCNC: 8 MG/DL (ref 8.7–10.4)
CHLORIDE SERPL-SCNC: 108 MEQ/L (ref 98–107)
CO2 SERPL-SCNC: 28 MEQ/L (ref 21–32)
COHGB MFR BLDA: 0.3 % (ref 0.5–1.5)
COLOR UR: YELLOW
CREAT SERPL-MCNC: 1.2 MG/DL (ref 0.6–1)
DO-HGB MFR BLDA: 5.1 % (ref 0–5)
ERYTHROCYTE [DISTWIDTH] IN BLOOD BY AUTOMATED COUNT: 25.5 % (ref 11.6–14.6)
GLUCOSE SERPL-MCNC: 75 MG/DL (ref 70–105)
GLUCOSE UR STRIP.AUTO-MCNC: NEGATIVE MG/DL
HCO3 BLDA-SCNC: 27.6 MMOL/L (ref 22–26)
HCT VFR BLD AUTO: 27.6 % (ref 36–48)
HGB BLD-MCNC: 8.8 G/DL (ref 12–16)
HGB BLDA OXIMETRY-MCNC: 9.3 G/DL (ref 12–18)
HGB UR QL STRIP: NEGATIVE
INHALED O2 CONCENTRATION: 32 %
KETONES UR STRIP-MCNC: NEGATIVE MG/DL
LEUKOCYTE ESTERASE UR QL STRIP: NEGATIVE
LYMPHOCYTES NFR BLD MANUAL: 19 % (ref 20–60)
MANUAL DIF COMMENT BLD-IMP: 1
MCH RBC QN AUTO: 31.5 PG (ref 28–32)
MCHC RBC AUTO-ENTMCNC: 32.1 G/DL (ref 31–37)
MCV RBC AUTO: 98.1 FL (ref 81–99)
METHGB MFR BLDA: 0.6 % (ref 0–1.5)
MONOCYTES NFR BLD MANUAL: 11 % (ref 2–8)
NEUTS BAND NFR BLD MANUAL: 1 % (ref 1–6)
NEUTS SEG NFR BLD MANUAL: 69 % (ref 45–75)
NITRITE UR QL STRIP: NEGATIVE
OXYHGB MFR BLDA: 94 % (ref 94–97)
PCO2 TEMP ADJ BLDA: 47.6 MMHG (ref 35–45)
PH TEMP ADJ BLDA: 7.38 [PH] (ref 7.35–7.45)
PH UR STRIP: 5.5 [PH] (ref 4.5–8)
PLATELET # BLD AUTO: 120 X1000/UL (ref 130–400)
PLATELET # BLD EST: (no result) 10*3/UL
PMV BLD AUTO: 12.5 FL (ref 7.4–10.4)
PO2 TEMP ADJ BLDA: 81.7 MMHG (ref 75–100)
POTASSIUM SERPL-SCNC: 4.2 MEQ/L (ref 3.5–5.1)
PROT UR QL STRIP: NEGATIVE
RBC # BLD AUTO: 2.81 MILL/UL (ref 4.2–5.4)
SAO2 % BLDA: 94.9 % (ref 92–98.5)
SODIUM SERPL-SCNC: 142 MEQ/L (ref 136–145)
SP GR UR STRIP: 1.02 (ref 1–1.03)
SPECIMEN DRAWN FROM PATIENT: (no result)
UROBILINOGEN UR STRIP-MCNC: 1 E.U./DL (ref 0.2–1)
VENTILATION MODE VENT: (no result)

## 2024-03-21 PROCEDURE — 5A09357 ASSISTANCE WITH RESPIRATORY VENTILATION, LESS THAN 24 CONSECUTIVE HOURS, CONTINUOUS POSITIVE AIRWAY PRESSURE: ICD-10-PCS | Performed by: INTERNAL MEDICINE

## 2024-03-21 RX ADMIN — HYDROXYCHLOROQUINE SULFATE SCH MG: 200 TABLET, FILM COATED ORAL at 09:27

## 2024-03-21 RX ADMIN — ALUMINUM HYDROXIDE, MAGNESIUM HYDROXIDE, AND SIMETHICONE PRN ML: 200; 200; 20 SUSPENSION ORAL at 21:29

## 2024-03-21 RX ADMIN — FOLIC ACID SCH MG: 1 TABLET ORAL at 09:27

## 2024-03-22 VITALS — RESPIRATION RATE: 19 BRPM | SYSTOLIC BLOOD PRESSURE: 125 MMHG | HEART RATE: 89 BPM | DIASTOLIC BLOOD PRESSURE: 63 MMHG

## 2024-03-22 VITALS — HEART RATE: 86 BPM | OXYGEN SATURATION: 98 % | RESPIRATION RATE: 22 BRPM

## 2024-03-22 VITALS — OXYGEN SATURATION: 100 % | HEART RATE: 84 BPM | RESPIRATION RATE: 22 BRPM

## 2024-03-22 VITALS
HEART RATE: 88 BPM | SYSTOLIC BLOOD PRESSURE: 125 MMHG | TEMPERATURE: 97.6 F | RESPIRATION RATE: 25 BRPM | DIASTOLIC BLOOD PRESSURE: 68 MMHG

## 2024-03-22 VITALS — RESPIRATION RATE: 25 BRPM | HEART RATE: 93 BPM | SYSTOLIC BLOOD PRESSURE: 122 MMHG | DIASTOLIC BLOOD PRESSURE: 77 MMHG

## 2024-03-22 VITALS
DIASTOLIC BLOOD PRESSURE: 67 MMHG | SYSTOLIC BLOOD PRESSURE: 129 MMHG | RESPIRATION RATE: 23 BRPM | TEMPERATURE: 97.8 F | HEART RATE: 82 BPM

## 2024-03-22 VITALS
SYSTOLIC BLOOD PRESSURE: 131 MMHG | RESPIRATION RATE: 24 BRPM | TEMPERATURE: 97.6 F | DIASTOLIC BLOOD PRESSURE: 66 MMHG | HEART RATE: 88 BPM

## 2024-03-22 VITALS
HEART RATE: 98 BPM | SYSTOLIC BLOOD PRESSURE: 141 MMHG | RESPIRATION RATE: 23 BRPM | TEMPERATURE: 97.5 F | DIASTOLIC BLOOD PRESSURE: 73 MMHG

## 2024-03-22 VITALS
RESPIRATION RATE: 29 BRPM | HEART RATE: 91 BPM | DIASTOLIC BLOOD PRESSURE: 103 MMHG | SYSTOLIC BLOOD PRESSURE: 192 MMHG | TEMPERATURE: 97.8 F

## 2024-03-22 VITALS — RESPIRATION RATE: 23 BRPM | HEART RATE: 91 BPM | OXYGEN SATURATION: 97 %

## 2024-03-22 VITALS — SYSTOLIC BLOOD PRESSURE: 100 MMHG | RESPIRATION RATE: 24 BRPM | DIASTOLIC BLOOD PRESSURE: 62 MMHG | HEART RATE: 90 BPM

## 2024-03-22 VITALS — OXYGEN SATURATION: 98 % | RESPIRATION RATE: 21 BRPM | HEART RATE: 96 BPM

## 2024-03-22 VITALS
TEMPERATURE: 97.2 F | HEART RATE: 88 BPM | RESPIRATION RATE: 27 BRPM | SYSTOLIC BLOOD PRESSURE: 123 MMHG | DIASTOLIC BLOOD PRESSURE: 71 MMHG

## 2024-03-22 VITALS — OXYGEN SATURATION: 92 % | HEART RATE: 85 BPM | RESPIRATION RATE: 26 BRPM

## 2024-03-22 VITALS
DIASTOLIC BLOOD PRESSURE: 54 MMHG | TEMPERATURE: 97.6 F | SYSTOLIC BLOOD PRESSURE: 115 MMHG | HEART RATE: 92 BPM | RESPIRATION RATE: 21 BRPM

## 2024-03-22 VITALS — RESPIRATION RATE: 23 BRPM

## 2024-03-22 VITALS — HEART RATE: 89 BPM | SYSTOLIC BLOOD PRESSURE: 120 MMHG | RESPIRATION RATE: 30 BRPM | DIASTOLIC BLOOD PRESSURE: 63 MMHG

## 2024-03-22 VITALS — HEART RATE: 87 BPM | DIASTOLIC BLOOD PRESSURE: 70 MMHG | SYSTOLIC BLOOD PRESSURE: 126 MMHG | RESPIRATION RATE: 27 BRPM

## 2024-03-22 VITALS — RESPIRATION RATE: 26 BRPM

## 2024-03-22 LAB
ANISOCYTOSIS BLD QL: (no result)
BASE EXCESS BLDA CALC-SCNC: 1.1 MMOL/L (ref -2–2)
BG TOTAL RESPIRATORY RATE: 24 B/MIN
BG VENT RATE: 18 SET
BUN SERPL-MCNC: 27 MG/DL (ref 9–23)
CALCIUM SERPL-MCNC: 7.8 MG/DL (ref 8.7–10.4)
CHLORIDE SERPL-SCNC: 109 MEQ/L (ref 98–107)
CO2 SERPL-SCNC: 26 MEQ/L (ref 21–32)
COHGB MFR BLDA: 0.3 % (ref 0.5–1.5)
CREAT SERPL-MCNC: 1.3 MG/DL (ref 0.6–1)
DO-HGB MFR BLDA: 4.6 % (ref 0–5)
EOSINOPHIL NFR BLD MANUAL: 2 % (ref 0–5)
ERYTHROCYTE [DISTWIDTH] IN BLOOD BY AUTOMATED COUNT: 25.4 % (ref 11.6–14.6)
ERYTHROCYTE [SEDIMENTATION RATE] IN BLOOD: 42 MM/HR (ref 0–30)
GLUCOSE SERPL-MCNC: 111 MG/DL (ref 70–105)
HCO3 BLDA-SCNC: 27.7 MMOL/L (ref 22–26)
HCT VFR BLD AUTO: 28.1 % (ref 36–48)
HGB BLD-MCNC: 9 G/DL (ref 12–16)
HGB BLDA OXIMETRY-MCNC: 10.2 G/DL (ref 12–18)
INHALED O2 CONCENTRATION: 40 %
LYMPHOCYTES NFR BLD MANUAL: 14 % (ref 20–60)
MANUAL DIF COMMENT BLD-IMP: 1
MCH RBC QN AUTO: 31.3 PG (ref 28–32)
MCHC RBC AUTO-ENTMCNC: 32.1 G/DL (ref 31–37)
MCV RBC AUTO: 97.3 FL (ref 81–99)
METAMYELOCYTES NFR BLD MANUAL: 1 % (ref 0–0)
METHGB MFR BLDA: 0.3 % (ref 0–1.5)
MONOCYTES NFR BLD MANUAL: 10 % (ref 2–8)
MYELOCYTES NFR BLD MANUAL: 1 % (ref 0–0)
NEUTS BAND NFR BLD MANUAL: 33 % (ref 1–6)
NEUTS SEG NFR BLD MANUAL: 39 % (ref 45–75)
OXYHGB MFR BLDA: 94.8 % (ref 94–97)
PCO2 TEMP ADJ BLDA: 54.2 MMHG (ref 35–45)
PH TEMP ADJ BLDA: 7.33 [PH] (ref 7.35–7.45)
PLATELET # BLD AUTO: 135 X1000/UL (ref 130–400)
PLATELET # BLD EST: NORMAL 10*3/UL
PMV BLD AUTO: 12.1 FL (ref 7.4–10.4)
PO2 TEMP ADJ BLDA: 88 MMHG (ref 75–100)
POTASSIUM SERPL-SCNC: 3.6 MEQ/L (ref 3.5–5.1)
RBC # BLD AUTO: 2.88 MILL/UL (ref 4.2–5.4)
SAO2 % BLDA: 95.4 % (ref 92–98.5)
SODIUM SERPL-SCNC: 139 MEQ/L (ref 136–145)
SPECIMEN DRAWN FROM PATIENT: (no result)
VENTILATION MODE VENT: (no result)
WBC # BLD: 17.5 X1000/UL (ref 4.5–11)

## 2024-03-22 PROCEDURE — 5A09357 ASSISTANCE WITH RESPIRATORY VENTILATION, LESS THAN 24 CONSECUTIVE HOURS, CONTINUOUS POSITIVE AIRWAY PRESSURE: ICD-10-PCS | Performed by: INTERNAL MEDICINE

## 2024-03-22 RX ADMIN — METHYLPREDNISOLONE SODIUM SUCCINATE SCH MG: 125 INJECTION, POWDER, FOR SOLUTION INTRAMUSCULAR; INTRAVENOUS at 19:02

## 2024-03-22 RX ADMIN — VANCOMYCIN HYDROCHLORIDE SCH MLS/HR: 1 INJECTION, SOLUTION INTRAVENOUS at 09:43

## 2024-03-23 VITALS
RESPIRATION RATE: 23 BRPM | HEART RATE: 80 BPM | TEMPERATURE: 97.6 F | DIASTOLIC BLOOD PRESSURE: 67 MMHG | SYSTOLIC BLOOD PRESSURE: 123 MMHG

## 2024-03-23 VITALS
HEART RATE: 97 BPM | DIASTOLIC BLOOD PRESSURE: 74 MMHG | SYSTOLIC BLOOD PRESSURE: 148 MMHG | RESPIRATION RATE: 14 BRPM | TEMPERATURE: 97.7 F

## 2024-03-23 VITALS — DIASTOLIC BLOOD PRESSURE: 67 MMHG | SYSTOLIC BLOOD PRESSURE: 127 MMHG | HEART RATE: 80 BPM | RESPIRATION RATE: 23 BRPM

## 2024-03-23 VITALS — RESPIRATION RATE: 17 BRPM | DIASTOLIC BLOOD PRESSURE: 61 MMHG | SYSTOLIC BLOOD PRESSURE: 119 MMHG | HEART RATE: 95 BPM

## 2024-03-23 VITALS
TEMPERATURE: 97.8 F | RESPIRATION RATE: 25 BRPM | HEART RATE: 87 BPM | DIASTOLIC BLOOD PRESSURE: 68 MMHG | SYSTOLIC BLOOD PRESSURE: 123 MMHG

## 2024-03-23 VITALS — DIASTOLIC BLOOD PRESSURE: 68 MMHG | SYSTOLIC BLOOD PRESSURE: 127 MMHG | HEART RATE: 91 BPM | RESPIRATION RATE: 21 BRPM

## 2024-03-23 VITALS
RESPIRATION RATE: 22 BRPM | DIASTOLIC BLOOD PRESSURE: 77 MMHG | TEMPERATURE: 97.8 F | HEART RATE: 86 BPM | SYSTOLIC BLOOD PRESSURE: 136 MMHG

## 2024-03-23 VITALS — RESPIRATION RATE: 18 BRPM | OXYGEN SATURATION: 100 % | HEART RATE: 82 BPM

## 2024-03-23 VITALS
DIASTOLIC BLOOD PRESSURE: 50 MMHG | TEMPERATURE: 98 F | RESPIRATION RATE: 20 BRPM | SYSTOLIC BLOOD PRESSURE: 125 MMHG | HEART RATE: 90 BPM

## 2024-03-23 VITALS
SYSTOLIC BLOOD PRESSURE: 151 MMHG | DIASTOLIC BLOOD PRESSURE: 75 MMHG | TEMPERATURE: 97.9 F | HEART RATE: 82 BPM | RESPIRATION RATE: 22 BRPM

## 2024-03-23 VITALS — RESPIRATION RATE: 18 BRPM | OXYGEN SATURATION: 99 % | HEART RATE: 98 BPM

## 2024-03-23 VITALS — OXYGEN SATURATION: 98 % | RESPIRATION RATE: 20 BRPM | HEART RATE: 101 BPM

## 2024-03-23 VITALS — DIASTOLIC BLOOD PRESSURE: 66 MMHG | HEART RATE: 95 BPM | SYSTOLIC BLOOD PRESSURE: 131 MMHG | RESPIRATION RATE: 22 BRPM

## 2024-03-23 VITALS — RESPIRATION RATE: 20 BRPM

## 2024-03-23 VITALS — OXYGEN SATURATION: 100 % | HEART RATE: 92 BPM | RESPIRATION RATE: 18 BRPM

## 2024-03-23 VITALS — RESPIRATION RATE: 22 BRPM

## 2024-03-23 VITALS — HEART RATE: 85 BPM | DIASTOLIC BLOOD PRESSURE: 68 MMHG | RESPIRATION RATE: 23 BRPM | SYSTOLIC BLOOD PRESSURE: 122 MMHG

## 2024-03-23 LAB
ANISOCYTOSIS BLD QL: (no result)
BUN SERPL-MCNC: 22 MG/DL (ref 9–23)
CALCIUM SERPL-MCNC: 7.7 MG/DL (ref 8.7–10.4)
CHLORIDE SERPL-SCNC: 110 MEQ/L (ref 98–107)
CO2 SERPL-SCNC: 28 MEQ/L (ref 21–32)
CREAT SERPL-MCNC: 1.1 MG/DL (ref 0.6–1)
ERYTHROCYTE [DISTWIDTH] IN BLOOD BY AUTOMATED COUNT: 25.1 % (ref 11.6–14.6)
GLUCOSE SERPL-MCNC: 172 MG/DL (ref 70–105)
HCT VFR BLD AUTO: 28 % (ref 36–48)
HGB BLD-MCNC: 8.9 G/DL (ref 12–16)
LYMPHOCYTES NFR BLD MANUAL: 10 % (ref 20–60)
MANUAL DIF COMMENT BLD-IMP: 1
MCH RBC QN AUTO: 31.1 PG (ref 28–32)
MCHC RBC AUTO-ENTMCNC: 31.8 G/DL (ref 31–37)
MCV RBC AUTO: 97.9 FL (ref 81–99)
MONOCYTES NFR BLD MANUAL: 1 % (ref 2–8)
NEUTS BAND NFR BLD MANUAL: 21 % (ref 1–6)
NEUTS SEG NFR BLD MANUAL: 68 % (ref 45–75)
PLATELET # BLD AUTO: 130 X1000/UL (ref 130–400)
PLATELET # BLD EST: NORMAL 10*3/UL
PMV BLD AUTO: 11.4 FL (ref 7.4–10.4)
POTASSIUM SERPL-SCNC: 5.1 MEQ/L (ref 3.5–5.1)
RBC # BLD AUTO: 2.86 MILL/UL (ref 4.2–5.4)
SODIUM SERPL-SCNC: 141 MEQ/L (ref 136–145)
TARGETS BLD QL SMEAR: (no result)
WBC # BLD: 12 X1000/UL (ref 4.5–11)
WBC NRBC COR # BLD AUTO: 1 /100 WBC

## 2024-03-23 PROCEDURE — 5A09357 ASSISTANCE WITH RESPIRATORY VENTILATION, LESS THAN 24 CONSECUTIVE HOURS, CONTINUOUS POSITIVE AIRWAY PRESSURE: ICD-10-PCS | Performed by: INTERNAL MEDICINE

## 2024-03-24 VITALS — HEART RATE: 108 BPM | SYSTOLIC BLOOD PRESSURE: 156 MMHG | DIASTOLIC BLOOD PRESSURE: 86 MMHG | RESPIRATION RATE: 24 BRPM

## 2024-03-24 VITALS
DIASTOLIC BLOOD PRESSURE: 64 MMHG | RESPIRATION RATE: 21 BRPM | TEMPERATURE: 98.2 F | SYSTOLIC BLOOD PRESSURE: 130 MMHG | HEART RATE: 91 BPM

## 2024-03-24 VITALS — RESPIRATION RATE: 28 BRPM | SYSTOLIC BLOOD PRESSURE: 113 MMHG | HEART RATE: 92 BPM | DIASTOLIC BLOOD PRESSURE: 60 MMHG

## 2024-03-24 VITALS
RESPIRATION RATE: 23 BRPM | DIASTOLIC BLOOD PRESSURE: 91 MMHG | HEART RATE: 122 BPM | TEMPERATURE: 98.8 F | SYSTOLIC BLOOD PRESSURE: 166 MMHG

## 2024-03-24 VITALS
DIASTOLIC BLOOD PRESSURE: 67 MMHG | SYSTOLIC BLOOD PRESSURE: 112 MMHG | TEMPERATURE: 98.3 F | RESPIRATION RATE: 20 BRPM | HEART RATE: 90 BPM

## 2024-03-24 VITALS — RESPIRATION RATE: 20 BRPM | HEART RATE: 88 BPM

## 2024-03-24 VITALS
SYSTOLIC BLOOD PRESSURE: 122 MMHG | DIASTOLIC BLOOD PRESSURE: 71 MMHG | TEMPERATURE: 98 F | HEART RATE: 85 BPM | RESPIRATION RATE: 19 BRPM

## 2024-03-24 VITALS — SYSTOLIC BLOOD PRESSURE: 132 MMHG | DIASTOLIC BLOOD PRESSURE: 68 MMHG | RESPIRATION RATE: 20 BRPM | HEART RATE: 89 BPM

## 2024-03-24 VITALS — RESPIRATION RATE: 23 BRPM | SYSTOLIC BLOOD PRESSURE: 158 MMHG | DIASTOLIC BLOOD PRESSURE: 85 MMHG | HEART RATE: 111 BPM

## 2024-03-24 VITALS — SYSTOLIC BLOOD PRESSURE: 122 MMHG | DIASTOLIC BLOOD PRESSURE: 69 MMHG | HEART RATE: 89 BPM | RESPIRATION RATE: 20 BRPM

## 2024-03-24 VITALS
TEMPERATURE: 98.5 F | HEART RATE: 88 BPM | DIASTOLIC BLOOD PRESSURE: 82 MMHG | SYSTOLIC BLOOD PRESSURE: 129 MMHG | RESPIRATION RATE: 22 BRPM

## 2024-03-24 VITALS — RESPIRATION RATE: 29 BRPM

## 2024-03-24 VITALS — OXYGEN SATURATION: 99 % | HEART RATE: 92 BPM | RESPIRATION RATE: 18 BRPM

## 2024-03-24 VITALS — RESPIRATION RATE: 21 BRPM

## 2024-03-24 VITALS — RESPIRATION RATE: 20 BRPM

## 2024-03-24 VITALS — RESPIRATION RATE: 19 BRPM

## 2024-03-24 LAB
ANISOCYTOSIS BLD QL: (no result)
BUN SERPL-MCNC: 20 MG/DL (ref 9–23)
CALCIUM SERPL-MCNC: 8 MG/DL (ref 8.7–10.4)
CHLORIDE SERPL-SCNC: 109 MEQ/L (ref 98–107)
CO2 SERPL-SCNC: 26 MEQ/L (ref 21–32)
CREAT SERPL-MCNC: 1 MG/DL (ref 0.6–1)
ERYTHROCYTE [DISTWIDTH] IN BLOOD BY AUTOMATED COUNT: 25.8 % (ref 11.6–14.6)
GLUCOSE SERPL-MCNC: 143 MG/DL (ref 70–105)
HCT VFR BLD AUTO: 31.2 % (ref 36–48)
HGB BLD-MCNC: 9.6 G/DL (ref 12–16)
LYMPHOCYTES NFR BLD MANUAL: 2 % (ref 20–60)
MANUAL DIF COMMENT BLD-IMP: 1
MCH RBC QN AUTO: 30.2 PG (ref 28–32)
MCHC RBC AUTO-ENTMCNC: 30.8 G/DL (ref 31–37)
MCV RBC AUTO: 98 FL (ref 81–99)
MONOCYTES NFR BLD MANUAL: 4 % (ref 2–8)
NEUTS BAND NFR BLD MANUAL: 9 % (ref 1–6)
NEUTS SEG NFR BLD MANUAL: 85 % (ref 45–75)
PLATELET # BLD AUTO: 159 X1000/UL (ref 130–400)
PLATELET # BLD EST: NORMAL 10*3/UL
PMV BLD AUTO: 12.1 FL (ref 7.4–10.4)
POTASSIUM SERPL-SCNC: 4.2 MEQ/L (ref 3.5–5.1)
RBC # BLD AUTO: 3.19 MILL/UL (ref 4.2–5.4)
SODIUM SERPL-SCNC: 142 MEQ/L (ref 136–145)
WBC # BLD: 26.9 X1000/UL (ref 4.5–11)
WBC NRBC COR # BLD AUTO: 1 /100 WBC

## 2024-03-24 PROCEDURE — 5A09357 ASSISTANCE WITH RESPIRATORY VENTILATION, LESS THAN 24 CONSECUTIVE HOURS, CONTINUOUS POSITIVE AIRWAY PRESSURE: ICD-10-PCS | Performed by: INTERNAL MEDICINE

## 2024-03-24 RX ADMIN — TAZOBACTAM SODIUM AND PIPERACILLIN SODIUM SCH MLS/HR: 375; 3 INJECTION, SOLUTION INTRAVENOUS at 10:30

## 2024-03-24 RX ADMIN — VANCOMYCIN HYDROCHLORIDE SCH MLS/HR: 1 INJECTION, SOLUTION INTRAVENOUS at 10:15

## 2024-03-24 RX ADMIN — IPRATROPIUM BROMIDE AND ALBUTEROL SULFATE SCH ML: .5; 3 SOLUTION RESPIRATORY (INHALATION) at 15:17

## 2024-03-24 RX ADMIN — AMLODIPINE BESYLATE SCH MG: 10 TABLET ORAL at 10:24

## 2024-03-25 VITALS
SYSTOLIC BLOOD PRESSURE: 119 MMHG | TEMPERATURE: 97.5 F | HEART RATE: 79 BPM | RESPIRATION RATE: 19 BRPM | DIASTOLIC BLOOD PRESSURE: 63 MMHG

## 2024-03-25 VITALS
TEMPERATURE: 97.6 F | RESPIRATION RATE: 19 BRPM | HEART RATE: 78 BPM | DIASTOLIC BLOOD PRESSURE: 59 MMHG | SYSTOLIC BLOOD PRESSURE: 121 MMHG

## 2024-03-25 VITALS — HEART RATE: 77 BPM | SYSTOLIC BLOOD PRESSURE: 108 MMHG | RESPIRATION RATE: 19 BRPM | DIASTOLIC BLOOD PRESSURE: 56 MMHG

## 2024-03-25 VITALS
RESPIRATION RATE: 19 BRPM | HEART RATE: 97 BPM | DIASTOLIC BLOOD PRESSURE: 69 MMHG | SYSTOLIC BLOOD PRESSURE: 140 MMHG | TEMPERATURE: 99.2 F

## 2024-03-25 VITALS — HEART RATE: 87 BPM | RESPIRATION RATE: 18 BRPM | OXYGEN SATURATION: 96 %

## 2024-03-25 VITALS — DIASTOLIC BLOOD PRESSURE: 60 MMHG | SYSTOLIC BLOOD PRESSURE: 117 MMHG | HEART RATE: 74 BPM | RESPIRATION RATE: 19 BRPM

## 2024-03-25 VITALS — SYSTOLIC BLOOD PRESSURE: 113 MMHG | DIASTOLIC BLOOD PRESSURE: 57 MMHG | RESPIRATION RATE: 19 BRPM | HEART RATE: 91 BPM

## 2024-03-25 VITALS
DIASTOLIC BLOOD PRESSURE: 62 MMHG | TEMPERATURE: 97.1 F | HEART RATE: 78 BPM | RESPIRATION RATE: 19 BRPM | SYSTOLIC BLOOD PRESSURE: 107 MMHG

## 2024-03-25 VITALS — HEART RATE: 83 BPM | RESPIRATION RATE: 19 BRPM | OXYGEN SATURATION: 100 %

## 2024-03-25 VITALS — HEART RATE: 92 BPM | RESPIRATION RATE: 19 BRPM

## 2024-03-25 VITALS — SYSTOLIC BLOOD PRESSURE: 116 MMHG | DIASTOLIC BLOOD PRESSURE: 57 MMHG | HEART RATE: 96 BPM | RESPIRATION RATE: 22 BRPM

## 2024-03-25 VITALS — RESPIRATION RATE: 18 BRPM

## 2024-03-25 VITALS
HEART RATE: 89 BPM | RESPIRATION RATE: 19 BRPM | SYSTOLIC BLOOD PRESSURE: 118 MMHG | TEMPERATURE: 97.6 F | DIASTOLIC BLOOD PRESSURE: 62 MMHG

## 2024-03-25 VITALS — RESPIRATION RATE: 17 BRPM | OXYGEN SATURATION: 97 % | HEART RATE: 89 BPM

## 2024-03-25 VITALS — DIASTOLIC BLOOD PRESSURE: 64 MMHG | RESPIRATION RATE: 18 BRPM | HEART RATE: 94 BPM | SYSTOLIC BLOOD PRESSURE: 135 MMHG

## 2024-03-25 VITALS — RESPIRATION RATE: 22 BRPM | HEART RATE: 96 BPM | TEMPERATURE: 97.5 F

## 2024-03-25 VITALS — RESPIRATION RATE: 21 BRPM

## 2024-03-25 VITALS — OXYGEN SATURATION: 98 % | HEART RATE: 94 BPM | RESPIRATION RATE: 20 BRPM

## 2024-03-25 LAB
ANISOCYTOSIS BLD QL: (no result)
BASE EXCESS BLDA CALC-SCNC: 3.3 MMOL/L (ref -2–2)
BUN SERPL-MCNC: 23 MG/DL (ref 9–23)
CALCIUM SERPL-MCNC: 7.8 MG/DL (ref 8.7–10.4)
CHLORIDE SERPL-SCNC: 108 MEQ/L (ref 98–107)
CO2 SERPL-SCNC: 29 MEQ/L (ref 21–32)
COHGB MFR BLDA: 0.3 % (ref 0.5–1.5)
CREAT SERPL-MCNC: 0.9 MG/DL (ref 0.6–1)
DO-HGB MFR BLDA: 14.3 % (ref 0–5)
ERYTHROCYTE [DISTWIDTH] IN BLOOD BY AUTOMATED COUNT: 25.3 % (ref 11.6–14.6)
GLUCOSE SERPL-MCNC: 118 MG/DL (ref 70–105)
HCO3 BLDA-SCNC: 27.7 MMOL/L (ref 22–26)
HCT VFR BLD AUTO: 25.9 % (ref 36–48)
HGB BLD-MCNC: 8.2 G/DL (ref 12–16)
HGB BLDA OXIMETRY-MCNC: 9.7 G/DL (ref 12–18)
INHALED O2 CONCENTRATION: 21 %
LYMPHOCYTES NFR BLD MANUAL: 7 % (ref 20–60)
MANUAL DIF COMMENT BLD-IMP: 1
MCH RBC QN AUTO: 30.7 PG (ref 28–32)
MCHC RBC AUTO-ENTMCNC: 31.6 G/DL (ref 31–37)
MCV RBC AUTO: 97 FL (ref 81–99)
METHGB MFR BLDA: 0.5 % (ref 0–1.5)
NEUTS BAND NFR BLD MANUAL: 2 % (ref 1–6)
NEUTS SEG NFR BLD MANUAL: 91 % (ref 45–75)
OXYHGB MFR BLDA: 84.9 % (ref 94–97)
PCO2 TEMP ADJ BLDA: 41.2 MMHG (ref 35–45)
PH TEMP ADJ BLDA: 7.45 [PH] (ref 7.35–7.45)
PLATELET # BLD AUTO: 126 X1000/UL (ref 130–400)
PLATELET # BLD EST: (no result) 10*3/UL
PMV BLD AUTO: 12 FL (ref 7.4–10.4)
PO2 TEMP ADJ BLDA: 52.3 MMHG (ref 75–100)
POTASSIUM SERPL-SCNC: 4.8 MEQ/L (ref 3.5–5.1)
RBC # BLD AUTO: 2.67 MILL/UL (ref 4.2–5.4)
SAO2 % BLDA: 85.6 % (ref 92–98.5)
SODIUM SERPL-SCNC: 141 MEQ/L (ref 136–145)
SPECIMEN DRAWN FROM PATIENT: (no result)
VENTILATION MODE VENT: (no result)
WBC # BLD: 18.6 X1000/UL (ref 4.5–11)

## 2024-03-25 PROCEDURE — 5A09357 ASSISTANCE WITH RESPIRATORY VENTILATION, LESS THAN 24 CONSECUTIVE HOURS, CONTINUOUS POSITIVE AIRWAY PRESSURE: ICD-10-PCS | Performed by: INTERNAL MEDICINE

## 2024-03-25 RX ADMIN — BUDESONIDE SCH MG: 0.5 SUSPENSION RESPIRATORY (INHALATION) at 08:37

## 2024-03-26 VITALS — RESPIRATION RATE: 18 BRPM | HEART RATE: 91 BPM | SYSTOLIC BLOOD PRESSURE: 132 MMHG | DIASTOLIC BLOOD PRESSURE: 67 MMHG

## 2024-03-26 VITALS — DIASTOLIC BLOOD PRESSURE: 61 MMHG | SYSTOLIC BLOOD PRESSURE: 122 MMHG | HEART RATE: 89 BPM | RESPIRATION RATE: 20 BRPM

## 2024-03-26 VITALS — HEART RATE: 88 BPM | OXYGEN SATURATION: 98 % | RESPIRATION RATE: 18 BRPM

## 2024-03-26 VITALS
DIASTOLIC BLOOD PRESSURE: 73 MMHG | SYSTOLIC BLOOD PRESSURE: 132 MMHG | TEMPERATURE: 97.4 F | OXYGEN SATURATION: 98 % | HEART RATE: 95 BPM

## 2024-03-26 VITALS
SYSTOLIC BLOOD PRESSURE: 130 MMHG | DIASTOLIC BLOOD PRESSURE: 91 MMHG | TEMPERATURE: 97.1 F | RESPIRATION RATE: 20 BRPM | HEART RATE: 92 BPM

## 2024-03-26 VITALS
DIASTOLIC BLOOD PRESSURE: 60 MMHG | SYSTOLIC BLOOD PRESSURE: 126 MMHG | RESPIRATION RATE: 18 BRPM | HEART RATE: 85 BPM | TEMPERATURE: 97.1 F

## 2024-03-26 VITALS — HEART RATE: 96 BPM | RESPIRATION RATE: 20 BRPM | OXYGEN SATURATION: 97 %

## 2024-03-26 VITALS
DIASTOLIC BLOOD PRESSURE: 68 MMHG | HEART RATE: 90 BPM | RESPIRATION RATE: 17 BRPM | SYSTOLIC BLOOD PRESSURE: 136 MMHG | TEMPERATURE: 97.6 F

## 2024-03-26 VITALS
SYSTOLIC BLOOD PRESSURE: 125 MMHG | HEART RATE: 85 BPM | TEMPERATURE: 97.9 F | RESPIRATION RATE: 19 BRPM | DIASTOLIC BLOOD PRESSURE: 67 MMHG

## 2024-03-26 VITALS — RESPIRATION RATE: 18 BRPM | HEART RATE: 92 BPM | OXYGEN SATURATION: 99 %

## 2024-03-26 VITALS
TEMPERATURE: 97.5 F | HEART RATE: 95 BPM | RESPIRATION RATE: 18 BRPM | SYSTOLIC BLOOD PRESSURE: 132 MMHG | DIASTOLIC BLOOD PRESSURE: 67 MMHG

## 2024-03-26 VITALS — SYSTOLIC BLOOD PRESSURE: 132 MMHG | HEART RATE: 98 BPM | DIASTOLIC BLOOD PRESSURE: 73 MMHG | RESPIRATION RATE: 27 BRPM

## 2024-03-26 VITALS — HEART RATE: 90 BPM | SYSTOLIC BLOOD PRESSURE: 131 MMHG | RESPIRATION RATE: 20 BRPM | DIASTOLIC BLOOD PRESSURE: 60 MMHG

## 2024-03-26 VITALS — RESPIRATION RATE: 18 BRPM | OXYGEN SATURATION: 98 % | HEART RATE: 92 BPM

## 2024-03-26 VITALS
SYSTOLIC BLOOD PRESSURE: 133 MMHG | TEMPERATURE: 97.3 F | RESPIRATION RATE: 20 BRPM | DIASTOLIC BLOOD PRESSURE: 68 MMHG | HEART RATE: 88 BPM

## 2024-03-26 VITALS — RESPIRATION RATE: 20 BRPM | HEART RATE: 94 BPM | OXYGEN SATURATION: 99 %

## 2024-03-26 VITALS — OXYGEN SATURATION: 99 % | RESPIRATION RATE: 18 BRPM | HEART RATE: 95 BPM

## 2024-03-26 LAB
ANISOCYTOSIS BLD QL: (no result)
BUN SERPL-MCNC: 24 MG/DL (ref 9–23)
CALCIUM SERPL-MCNC: 8.3 MG/DL (ref 8.7–10.4)
CHLORIDE SERPL-SCNC: 106 MEQ/L (ref 98–107)
CO2 SERPL-SCNC: 29 MEQ/L (ref 21–32)
CREAT SERPL-MCNC: 0.9 MG/DL (ref 0.6–1)
ERYTHROCYTE [DISTWIDTH] IN BLOOD BY AUTOMATED COUNT: 25.9 % (ref 11.6–14.6)
GLUCOSE SERPL-MCNC: 131 MG/DL (ref 70–105)
HCT VFR BLD AUTO: 27.5 % (ref 36–48)
HGB BLD-MCNC: 8.5 G/DL (ref 12–16)
HYPOCHROMIA BLD QL: (no result)
LYMPHOCYTES NFR BLD MANUAL: 7 % (ref 20–60)
MANUAL DIF COMMENT BLD-IMP: 1
MCH RBC QN AUTO: 30 PG (ref 28–32)
MCHC RBC AUTO-ENTMCNC: 31 G/DL (ref 31–37)
MCV RBC AUTO: 96.7 FL (ref 81–99)
MONOCYTES NFR BLD MANUAL: 1 % (ref 2–8)
NEUTS BAND NFR BLD MANUAL: 14 % (ref 1–6)
NEUTS SEG NFR BLD MANUAL: 78 % (ref 45–75)
PLATELET # BLD AUTO: 142 X1000/UL (ref 130–400)
PLATELET # BLD EST: NORMAL 10*3/UL
PMV BLD AUTO: 12.5 FL (ref 7.4–10.4)
POTASSIUM SERPL-SCNC: 4.5 MEQ/L (ref 3.5–5.1)
RBC # BLD AUTO: 2.84 MILL/UL (ref 4.2–5.4)
SODIUM SERPL-SCNC: 142 MEQ/L (ref 136–145)
TARGETS BLD QL SMEAR: (no result)
WBC # BLD: 15.8 X1000/UL (ref 4.5–11)